# Patient Record
Sex: FEMALE | Race: WHITE | HISPANIC OR LATINO
[De-identification: names, ages, dates, MRNs, and addresses within clinical notes are randomized per-mention and may not be internally consistent; named-entity substitution may affect disease eponyms.]

---

## 2017-07-17 ENCOUNTER — APPOINTMENT (OUTPATIENT)
Dept: FAMILY MEDICINE | Facility: HOSPITAL | Age: 27
End: 2017-07-17

## 2017-07-17 ENCOUNTER — RESULT CHARGE (OUTPATIENT)
Age: 27
End: 2017-07-17

## 2017-07-17 ENCOUNTER — OUTPATIENT (OUTPATIENT)
Dept: OUTPATIENT SERVICES | Facility: HOSPITAL | Age: 27
LOS: 1 days | End: 2017-07-17
Payer: SELF-PAY

## 2017-07-17 VITALS
DIASTOLIC BLOOD PRESSURE: 75 MMHG | OXYGEN SATURATION: 98 % | TEMPERATURE: 98.1 F | HEIGHT: 59 IN | HEART RATE: 93 BPM | BODY MASS INDEX: 24.39 KG/M2 | RESPIRATION RATE: 16 BRPM | WEIGHT: 121 LBS | SYSTOLIC BLOOD PRESSURE: 112 MMHG

## 2017-07-18 ENCOUNTER — FORM ENCOUNTER (OUTPATIENT)
Age: 27
End: 2017-07-18

## 2017-07-18 LAB
BILIRUB UR QL STRIP: NEGATIVE
CLARITY UR: CLEAR
COLLECTION METHOD: NORMAL
GLUCOSE UR-MCNC: NEGATIVE
HCG UR QL: NEGATIVE
HGB UR QL STRIP.AUTO: NORMAL
KETONES UR-MCNC: NEGATIVE
LEUKOCYTE ESTERASE UR QL STRIP: NEGATIVE
NITRITE UR QL STRIP: NEGATIVE
QUALITY CONTROL: YES
SP GR UR STRIP: 1.02

## 2017-07-19 PROCEDURE — G0463: CPT

## 2017-07-19 PROCEDURE — 76700 US EXAM ABDOM COMPLETE: CPT

## 2017-07-19 PROCEDURE — 87086 URINE CULTURE/COLONY COUNT: CPT

## 2017-07-19 PROCEDURE — 81002 URINALYSIS NONAUTO W/O SCOPE: CPT

## 2017-07-27 ENCOUNTER — APPOINTMENT (OUTPATIENT)
Dept: FAMILY MEDICINE | Facility: HOSPITAL | Age: 27
End: 2017-07-27

## 2017-07-27 VITALS
DIASTOLIC BLOOD PRESSURE: 78 MMHG | BODY MASS INDEX: 24.6 KG/M2 | RESPIRATION RATE: 16 BRPM | OXYGEN SATURATION: 98 % | SYSTOLIC BLOOD PRESSURE: 115 MMHG | WEIGHT: 122 LBS | HEART RATE: 92 BPM | TEMPERATURE: 98 F | HEIGHT: 59 IN

## 2017-08-29 ENCOUNTER — EMERGENCY (EMERGENCY)
Facility: HOSPITAL | Age: 27
LOS: 1 days | Discharge: HOME | End: 2017-08-29
Attending: EMERGENCY MEDICINE | Admitting: EMERGENCY MEDICINE
Payer: MEDICAID

## 2017-08-29 VITALS
TEMPERATURE: 98 F | OXYGEN SATURATION: 100 % | HEART RATE: 59 BPM | RESPIRATION RATE: 16 BRPM | DIASTOLIC BLOOD PRESSURE: 75 MMHG | SYSTOLIC BLOOD PRESSURE: 115 MMHG

## 2017-08-29 VITALS
SYSTOLIC BLOOD PRESSURE: 125 MMHG | DIASTOLIC BLOOD PRESSURE: 84 MMHG | OXYGEN SATURATION: 100 % | TEMPERATURE: 98 F | WEIGHT: 119.93 LBS | HEART RATE: 78 BPM | RESPIRATION RATE: 18 BRPM

## 2017-08-29 LAB
ALBUMIN SERPL ELPH-MCNC: 4.4 G/DL — SIGNIFICANT CHANGE UP (ref 3.3–5)
ALP SERPL-CCNC: 83 U/L — SIGNIFICANT CHANGE UP (ref 40–120)
ALT FLD-CCNC: 17 U/L RC — SIGNIFICANT CHANGE UP (ref 10–45)
ANION GAP SERPL CALC-SCNC: 13 MMOL/L — SIGNIFICANT CHANGE UP (ref 5–17)
APPEARANCE UR: CLEAR — SIGNIFICANT CHANGE UP
AST SERPL-CCNC: 22 U/L — SIGNIFICANT CHANGE UP (ref 10–40)
BACTERIA # UR AUTO: ABNORMAL /HPF
BASOPHILS # BLD AUTO: 0.1 K/UL — SIGNIFICANT CHANGE UP (ref 0–0.2)
BASOPHILS NFR BLD AUTO: 1 % — SIGNIFICANT CHANGE UP (ref 0–2)
BILIRUB SERPL-MCNC: 0.3 MG/DL — SIGNIFICANT CHANGE UP (ref 0.2–1.2)
BILIRUB UR-MCNC: NEGATIVE — SIGNIFICANT CHANGE UP
BUN SERPL-MCNC: 10 MG/DL — SIGNIFICANT CHANGE UP (ref 7–23)
CALCIUM SERPL-MCNC: 9 MG/DL — SIGNIFICANT CHANGE UP (ref 8.4–10.5)
CHLORIDE SERPL-SCNC: 103 MMOL/L — SIGNIFICANT CHANGE UP (ref 96–108)
CO2 SERPL-SCNC: 24 MMOL/L — SIGNIFICANT CHANGE UP (ref 22–31)
COLOR SPEC: YELLOW — SIGNIFICANT CHANGE UP
CREAT SERPL-MCNC: 0.78 MG/DL — SIGNIFICANT CHANGE UP (ref 0.5–1.3)
DIFF PNL FLD: ABNORMAL
EOSINOPHIL # BLD AUTO: 0 K/UL — SIGNIFICANT CHANGE UP (ref 0–0.5)
EOSINOPHIL NFR BLD AUTO: 0.5 % — SIGNIFICANT CHANGE UP (ref 0–6)
EPI CELLS # UR: SIGNIFICANT CHANGE UP /HPF
GLUCOSE SERPL-MCNC: 87 MG/DL — SIGNIFICANT CHANGE UP (ref 70–99)
GLUCOSE UR QL: NEGATIVE — SIGNIFICANT CHANGE UP
HCG SERPL-ACNC: <2 MIU/ML — SIGNIFICANT CHANGE UP
HCT VFR BLD CALC: 39.6 % — SIGNIFICANT CHANGE UP (ref 34.5–45)
HGB BLD-MCNC: 13.5 G/DL — SIGNIFICANT CHANGE UP (ref 11.5–15.5)
KETONES UR-MCNC: NEGATIVE — SIGNIFICANT CHANGE UP
LEUKOCYTE ESTERASE UR-ACNC: NEGATIVE — SIGNIFICANT CHANGE UP
LIDOCAIN IGE QN: 43 U/L — SIGNIFICANT CHANGE UP (ref 7–60)
LYMPHOCYTES # BLD AUTO: 2.4 K/UL — SIGNIFICANT CHANGE UP (ref 1–3.3)
LYMPHOCYTES # BLD AUTO: 42.5 % — SIGNIFICANT CHANGE UP (ref 13–44)
MCHC RBC-ENTMCNC: 27.1 PG — SIGNIFICANT CHANGE UP (ref 27–34)
MCHC RBC-ENTMCNC: 34 GM/DL — SIGNIFICANT CHANGE UP (ref 32–36)
MCV RBC AUTO: 79.6 FL — LOW (ref 80–100)
MONOCYTES # BLD AUTO: 0.4 K/UL — SIGNIFICANT CHANGE UP (ref 0–0.9)
MONOCYTES NFR BLD AUTO: 7.5 % — SIGNIFICANT CHANGE UP (ref 2–14)
NEUTROPHILS # BLD AUTO: 2.8 K/UL — SIGNIFICANT CHANGE UP (ref 1.8–7.4)
NEUTROPHILS NFR BLD AUTO: 48.5 % — SIGNIFICANT CHANGE UP (ref 43–77)
NITRITE UR-MCNC: NEGATIVE — SIGNIFICANT CHANGE UP
PH UR: 6 — SIGNIFICANT CHANGE UP (ref 5–8)
PLATELET # BLD AUTO: 237 K/UL — SIGNIFICANT CHANGE UP (ref 150–400)
POTASSIUM SERPL-MCNC: 3.6 MMOL/L — SIGNIFICANT CHANGE UP (ref 3.5–5.3)
POTASSIUM SERPL-SCNC: 3.6 MMOL/L — SIGNIFICANT CHANGE UP (ref 3.5–5.3)
PROT SERPL-MCNC: 7.9 G/DL — SIGNIFICANT CHANGE UP (ref 6–8.3)
PROT UR-MCNC: SIGNIFICANT CHANGE UP
RBC # BLD: 4.97 M/UL — SIGNIFICANT CHANGE UP (ref 3.8–5.2)
RBC # FLD: 13.8 % — SIGNIFICANT CHANGE UP (ref 10.3–14.5)
RBC CASTS # UR COMP ASSIST: SIGNIFICANT CHANGE UP /HPF (ref 0–2)
SODIUM SERPL-SCNC: 140 MMOL/L — SIGNIFICANT CHANGE UP (ref 135–145)
SP GR SPEC: 1.02 — SIGNIFICANT CHANGE UP (ref 1.01–1.02)
UROBILINOGEN FLD QL: NEGATIVE — SIGNIFICANT CHANGE UP
WBC # BLD: 5.7 K/UL — SIGNIFICANT CHANGE UP (ref 3.8–10.5)
WBC # FLD AUTO: 5.7 K/UL — SIGNIFICANT CHANGE UP (ref 3.8–10.5)
WBC UR QL: SIGNIFICANT CHANGE UP /HPF (ref 0–5)

## 2017-08-29 PROCEDURE — 99284 EMERGENCY DEPT VISIT MOD MDM: CPT | Mod: 25

## 2017-08-29 PROCEDURE — 87086 URINE CULTURE/COLONY COUNT: CPT

## 2017-08-29 PROCEDURE — 80053 COMPREHEN METABOLIC PANEL: CPT

## 2017-08-29 PROCEDURE — 78226 HEPATOBILIARY SYSTEM IMAGING: CPT | Mod: 26

## 2017-08-29 PROCEDURE — 83690 ASSAY OF LIPASE: CPT

## 2017-08-29 PROCEDURE — 81001 URINALYSIS AUTO W/SCOPE: CPT

## 2017-08-29 PROCEDURE — 85027 COMPLETE CBC AUTOMATED: CPT

## 2017-08-29 PROCEDURE — 84702 CHORIONIC GONADOTROPIN TEST: CPT

## 2017-08-29 PROCEDURE — 76705 ECHO EXAM OF ABDOMEN: CPT | Mod: 26

## 2017-08-29 PROCEDURE — 82550 ASSAY OF CK (CPK): CPT

## 2017-08-29 PROCEDURE — 99285 EMERGENCY DEPT VISIT HI MDM: CPT

## 2017-08-29 PROCEDURE — A9537: CPT

## 2017-08-29 PROCEDURE — 78226 HEPATOBILIARY SYSTEM IMAGING: CPT

## 2017-08-29 PROCEDURE — 96374 THER/PROPH/DIAG INJ IV PUSH: CPT

## 2017-08-29 PROCEDURE — 76705 ECHO EXAM OF ABDOMEN: CPT

## 2017-08-29 RX ORDER — FAMOTIDINE 10 MG/ML
20 INJECTION INTRAVENOUS ONCE
Qty: 0 | Refills: 0 | Status: COMPLETED | OUTPATIENT
Start: 2017-08-29 | End: 2017-08-29

## 2017-08-29 RX ADMIN — Medication 30 MILLILITER(S): at 12:33

## 2017-08-29 RX ADMIN — FAMOTIDINE 20 MILLIGRAM(S): 10 INJECTION INTRAVENOUS at 12:33

## 2017-08-29 NOTE — ED ADULT NURSE REASSESSMENT NOTE - NS ED NURSE REASSESS COMMENT FT1
Patient laying in bed with family at bedside. States she feels better and only has a little bit of pain. MD Mcgowan made aware. Patient to be discharged.

## 2017-08-29 NOTE — ED ADULT NURSE NOTE - OBJECTIVE STATEMENT
26y.o female arrived to ED c.o epigastric pain x3 months. Pt was seen by PMD treated for UTI 1 month ago. Pt arrived a&ox4, neg sob, neg chest pain, + epigastric pain, + nausea, + diarrhea, + urinary frequency, + diarrhea, abd soft, pulse motor sensoryx4, skin warm dry intact. 20g IV LAC. Pt resting comfortably, family at bedside. will continue to monitor. 26y.o female arrived to ED c.o epigastric pain x3 months. Pt was seen by PMD treated for UTI 1 month ago. Pt arrived a&ox4, neg sob, neg chest pain, + epigastric pain, + nausea, + diarrhea, + urinary frequency, pt currently menstruating,  + diarrhea, abd soft, pulse motor sensoryx4, skin warm dry intact. 20g IV LAC. Pt resting comfortably, family at bedside. will continue to monitor.

## 2017-08-29 NOTE — CONSULT NOTE ADULT - ASSESSMENT
26 year old woman with abdominal pain consistent with biliary colic vs. gastritis/gastric ulcer.  - Recommend GI evaluation as outpatient  - Recommend follow up as outpatient with Dr. Torrey Barrios regarding possible elective laparoscopic cholecystectomy  - Discussed with Dr. Zack Farris PGY3 [id number]

## 2017-08-29 NOTE — ED PROVIDER NOTE - PROGRESS NOTE DETAILS
indeterminate us, d/w surg will see pt -Josafat Mcgowan MD- pt is feeling better. told to outpt with sx. HIDA normal

## 2017-08-29 NOTE — ED PROVIDER NOTE - OBJECTIVE STATEMENT
26 female presents with c/o epigastric pain x 3 months. pt states pain is worse after eating, also associated with diarrhea. pt states also with pain when urinating intermittently for few months. pt states was given medication by her doctor for UTI 1 month ago, but symptoms have returned.

## 2017-08-29 NOTE — ED PROVIDER NOTE - ATTENDING CONTRIBUTION TO CARE
26 year old female epigastric abdominal pain today, worse w food, similar pain for 3 months. ttp ruq. labs, us gb, ua, preg, ekg.

## 2017-08-29 NOTE — CONSULT NOTE ADULT - SUBJECTIVE AND OBJECTIVE BOX
General Surgery Consultation Note  Pager#[phone]    HPI: 26 year old woman presents with epigastric and right upper quadrant pain since 8 am this morning.  She has had similar intermittent pain for the past 3 months but the pain usually does not last this long.  Her usual pain is not associated with food or any particular time of day.  Her pain has improved since she has been in the ED.  RUQ ultrasound shows cholelithiasis, not consistent with cholecystitis.  HIDA scan is not concerning for cholecystitis.        Medical History: none  Surgical History: none  Allergies: No known drug allergies  Social History: nonsmoker, no etoh or drug abuse; lives with  and son  Family History: noncontributory    Review of Systems:  General: No weight changes, no fevers or chills, no weakness  Neurologic: No numbness or weakness, no facial drooping  Cardiovascular: No chest pain or shortness of breath, no extremity edema  Pulmonary: No cough or hemoptysis, no wheezing  Abdominal: + epigastric/RUQ abdominal pain, no hematochezia, no changes in bowel habits  Genitourinary: No hematuria, no dysuria  Skin: no rashes or bruising        Vital Signs Last 24 Hrs  T(C): 36.9 (29 Aug 2017 15:33), Max: 36.9 (29 Aug 2017 15:33)  T(F): 98.5 (29 Aug 2017 15:33), Max: 98.5 (29 Aug 2017 15:33)  HR: 59 (29 Aug 2017 15:33) (59 - 78)  BP: 115/75 (29 Aug 2017 15:33) (115/75 - 134/85)  RR: 16 (29 Aug 2017 15:33) (16 - 18)  SpO2: 100% (29 Aug 2017 15:33) (99% - 100%)    Physical Exam  General: alert, no distress  Psychiatric: affect appropriate  Neurologic: No focal neurologic deficits  Cardiovascular: Regular rate and rhythm  Pulmonary: Breathing unlabored  Abdominal: Soft, nondistended, nontender  Extremities: Moves all extremities, warm  Skin: Warm and dry, no wounds    CBC ( @ 12:07)                          13.5                     5.7     )--------------(  237        48.5  % Neuts, 42.5  % Lymphs, ANC: 2.8                             39.6      BMP ( @ 12:07)       140     |  103     |  10    			Ca++ --      Ca 9.0          ---------------------------------( 87    		Mg --           3.6     |  24      |  0.78  			Ph --        LFTs ( @ 12:07)      TPro 7.9 / Alb 4.4 / TBili 0.3 / DBili -- / AST 22 / ALT 17 / AlkPhos 83      Cardiac Markers ( @ 12:07)     Trop: -- -- / CKMB: -- / CK: 84    Urinalysis ( @ 12:07):     Color: Yellow / Appearance: Clear / S.017 / pH: 6.0 / Gluc: Negative / Ketones: Negative / Bili: Negative / Urobili: Negative / Protein :Trace / Nitrites: Negative / Leuk.Est: Negative / RBC: 3-5 / WBC: 0-2 / Sq Epi:  / Non Sq Epi: Few / Bacteria Few<!>       Imaging: see HPI

## 2017-08-29 NOTE — CONSULT NOTE ADULT - ATTENDING COMMENTS
Patient discussed w/ resident. Discharged from ED prior to my evaluation.  Abd pain, possible biliary colic. No cholecystitis on imaging. Patient may f/u with surgery if desired as outpt.

## 2017-08-30 LAB
CULTURE RESULTS: SIGNIFICANT CHANGE UP
SPECIMEN SOURCE: SIGNIFICANT CHANGE UP

## 2018-04-03 ENCOUNTER — EMERGENCY (EMERGENCY)
Facility: HOSPITAL | Age: 28
LOS: 1 days | Discharge: HOME | End: 2018-04-03
Attending: EMERGENCY MEDICINE
Payer: MEDICAID

## 2018-04-03 VITALS
OXYGEN SATURATION: 99 % | SYSTOLIC BLOOD PRESSURE: 113 MMHG | HEART RATE: 119 BPM | TEMPERATURE: 101 F | RESPIRATION RATE: 20 BRPM | DIASTOLIC BLOOD PRESSURE: 80 MMHG

## 2018-04-03 PROCEDURE — 93010 ELECTROCARDIOGRAM REPORT: CPT | Mod: 59

## 2018-04-03 PROCEDURE — 62270 DX LMBR SPI PNXR: CPT

## 2018-04-03 PROCEDURE — 99284 EMERGENCY DEPT VISIT MOD MDM: CPT | Mod: 25

## 2018-04-03 RX ORDER — VANCOMYCIN HCL 1 G
1000 VIAL (EA) INTRAVENOUS ONCE
Qty: 0 | Refills: 0 | Status: COMPLETED | OUTPATIENT
Start: 2018-04-03 | End: 2018-04-04

## 2018-04-03 RX ORDER — DEXAMETHASONE 0.5 MG/5ML
10 ELIXIR ORAL ONCE
Qty: 0 | Refills: 0 | Status: COMPLETED | OUTPATIENT
Start: 2018-04-03 | End: 2018-04-03

## 2018-04-03 RX ORDER — IPRATROPIUM/ALBUTEROL SULFATE 18-103MCG
3 AEROSOL WITH ADAPTER (GRAM) INHALATION ONCE
Qty: 0 | Refills: 0 | Status: COMPLETED | OUTPATIENT
Start: 2018-04-03 | End: 2018-04-03

## 2018-04-03 RX ORDER — SODIUM CHLORIDE 9 MG/ML
2000 INJECTION INTRAMUSCULAR; INTRAVENOUS; SUBCUTANEOUS ONCE
Qty: 0 | Refills: 0 | Status: COMPLETED | OUTPATIENT
Start: 2018-04-03 | End: 2018-04-03

## 2018-04-03 RX ORDER — ACETAMINOPHEN 500 MG
1000 TABLET ORAL ONCE
Qty: 0 | Refills: 0 | Status: COMPLETED | OUTPATIENT
Start: 2018-04-03 | End: 2018-04-04

## 2018-04-03 RX ORDER — CEFTRIAXONE 500 MG/1
2 INJECTION, POWDER, FOR SOLUTION INTRAMUSCULAR; INTRAVENOUS ONCE
Qty: 0 | Refills: 0 | Status: COMPLETED | OUTPATIENT
Start: 2018-04-03 | End: 2018-04-03

## 2018-04-03 NOTE — ED PROVIDER NOTE - PROGRESS NOTE DETAILS
patient feeling much better. lp without any signs of meningitis. vss. walking around ED without issues. Pt does have LLL pneumonia - treated with abx and abx sent to pharmacy. Pt will follow up at medicine clinic. pt given strict return precautions. Chintan Hernandez M.D., Attending Physician

## 2018-04-03 NOTE — ED PROVIDER NOTE - ATTENDING CONTRIBUTION TO CARE
27F Surinamese speaking  #[id number] presents to the ED with fever cough and headache. Symptoms started 4 days ago. Cough non-productive. Today pt started having headache 10/10 associated with photophobia and neck stiffiness. Pt states neck discomfort worse when moving side head from side to side. No skin rash. No one at home sick. no recent travel. Tried tylenol early in the day without help. On Exam pt uncomfortable appearing tachycardic clear lungs abd soft non-tender erythema to b/l eyes pharynx normal no pta uvual midline neck without signs of ludwigs. no skin rash. Given hx and fever likely virus however headache photophobia and neck pain concern for meningitis - likely viral. Will obtain labs xray empiric coverage abx steroids and likely admit.     Constitutional: + fever and chills  Eyes: eye redness + photophobia  HEENT: No throat pain  CV: No chest pain  Resp: + SOB and cough  GI: No abd pain, nausea or vomiting  : No dysuria  MSK: + body aches and neck pain  Skin: No rash  Neuro: No headache     Constitutional: mild distress AAOx3  Eyes: PERRLA EOMI  Head: Normocephalic atraumatic  Mouth: MMM  Cardiac: tachycardic  Resp: Lungs CTAB  GI: Abd s/nt/nd  Neuro: CN2-12 intact  Skin: No rashes   Chintan Hernandez M.D., Attending Physician

## 2018-04-03 NOTE — ED PROVIDER NOTE - CARE PLAN
Principal Discharge DX:	Pneumonia  Assessment and plan of treatment:	1. return for worsening symptoms or anything concerning to you  2. take all home meds as prescribed  3. follow up with your pmd call to make an appointment  4. take levaquin as directed  5. drink plenty of fluids  6. follow up with medicine clinic see attached

## 2018-04-03 NOTE — ED PROVIDER NOTE - PLAN OF CARE
1. return for worsening symptoms or anything concerning to you  2. take all home meds as prescribed  3. follow up with your pmd call to make an appointment  4. take levaquin as directed  5. drink plenty of fluids  6. follow up with medicine clinic see attached

## 2018-04-03 NOTE — ED PROVIDER NOTE - MEDICAL DECISION MAKING DETAILS
27F Polish speaking  #[id number] presents to the ED with fever cough and headache. Symptoms started 4 days ago. Cough non-productive. Today pt started having headache 10/10 associated with photophobia and neck stiffiness. Pt states neck discomfort worse when moving side head from side to side. No skin rash. No one at home sick. no recent travel. Tried tylenol early in the day without help. On Exam pt uncomfortable appearing tachycardic clear lungs abd soft non-tender erythema to b/l eyes pharynx normal no pta uvual midline neck without signs of ludwigs. no skin rash. Given hx and fever likely virus however headache photophobia and neck pain concern for meningitis - likely viral. Will obtain labs xray empiric coverage abx steroids and likely admit. Chintan Hernandez M.D., Attending Physician

## 2018-04-03 NOTE — ED ADULT NURSE NOTE - OBJECTIVE STATEMENT
28 yo presents to the ED from home. A&Ox4, primarily [place] Islander speaking, family at bedside for translation, denies translation services. pt reports body aches, nonproductive cough, runny nose, HA, nausea x 4 days. pt denies abd pain, diarrhea, vomiting. pt reports taking Tylenol for pain and fever, last dose taken at 1600 today. pt is febrile upon assessment. pt denies CP, SOB. pt denies medical history. pt denies sick contact. pt denies international travel. pt denies rash. family at bedside.

## 2018-04-04 VITALS
HEART RATE: 94 BPM | RESPIRATION RATE: 17 BRPM | TEMPERATURE: 98 F | DIASTOLIC BLOOD PRESSURE: 65 MMHG | OXYGEN SATURATION: 98 % | SYSTOLIC BLOOD PRESSURE: 97 MMHG

## 2018-04-04 LAB
ALBUMIN SERPL ELPH-MCNC: 4 G/DL — SIGNIFICANT CHANGE UP (ref 3.3–5)
ALP SERPL-CCNC: 101 U/L — SIGNIFICANT CHANGE UP (ref 40–120)
ALT FLD-CCNC: 19 U/L RC — SIGNIFICANT CHANGE UP (ref 10–45)
ANION GAP SERPL CALC-SCNC: 16 MMOL/L — SIGNIFICANT CHANGE UP (ref 5–17)
APPEARANCE CSF: CLEAR — SIGNIFICANT CHANGE UP
APPEARANCE UR: CLEAR — SIGNIFICANT CHANGE UP
AST SERPL-CCNC: 23 U/L — SIGNIFICANT CHANGE UP (ref 10–40)
B PERT DNA SPEC QL NAA+PROBE: DETECTED
BASOPHILS # BLD AUTO: 0 K/UL — SIGNIFICANT CHANGE UP (ref 0–0.2)
BASOPHILS NFR BLD AUTO: 0.3 % — SIGNIFICANT CHANGE UP (ref 0–2)
BILIRUB SERPL-MCNC: 0.2 MG/DL — SIGNIFICANT CHANGE UP (ref 0.2–1.2)
BILIRUB UR-MCNC: NEGATIVE — SIGNIFICANT CHANGE UP
BUN SERPL-MCNC: 8 MG/DL — SIGNIFICANT CHANGE UP (ref 7–23)
CALCIUM SERPL-MCNC: 8.8 MG/DL — SIGNIFICANT CHANGE UP (ref 8.4–10.5)
CHLORIDE SERPL-SCNC: 100 MMOL/L — SIGNIFICANT CHANGE UP (ref 96–108)
CO2 SERPL-SCNC: 21 MMOL/L — LOW (ref 22–31)
COLOR CSF: SIGNIFICANT CHANGE UP
COLOR SPEC: SIGNIFICANT CHANGE UP
CREAT SERPL-MCNC: 0.78 MG/DL — SIGNIFICANT CHANGE UP (ref 0.5–1.3)
DIFF PNL FLD: NEGATIVE — SIGNIFICANT CHANGE UP
EOSINOPHIL # BLD AUTO: 0 K/UL — SIGNIFICANT CHANGE UP (ref 0–0.5)
EOSINOPHIL NFR BLD AUTO: 0.2 % — SIGNIFICANT CHANGE UP (ref 0–6)
EPI CELLS # UR: SIGNIFICANT CHANGE UP /HPF
GAS PNL BLDV: SIGNIFICANT CHANGE UP
GLUCOSE CSF-MCNC: 61 MG/DL — SIGNIFICANT CHANGE UP (ref 40–70)
GLUCOSE SERPL-MCNC: 97 MG/DL — SIGNIFICANT CHANGE UP (ref 70–99)
GLUCOSE UR QL: NEGATIVE — SIGNIFICANT CHANGE UP
GRAM STN FLD: SIGNIFICANT CHANGE UP
HCG SERPL-ACNC: <2 MIU/ML — LOW (ref 5–24)
HCT VFR BLD CALC: 38.1 % — SIGNIFICANT CHANGE UP (ref 34.5–45)
HGB BLD-MCNC: 13 G/DL — SIGNIFICANT CHANGE UP (ref 11.5–15.5)
KETONES UR-MCNC: ABNORMAL
LEUKOCYTE ESTERASE UR-ACNC: NEGATIVE — SIGNIFICANT CHANGE UP
LYMPHOCYTES # BLD AUTO: 1.7 K/UL — SIGNIFICANT CHANGE UP (ref 1–3.3)
LYMPHOCYTES # BLD AUTO: 25.7 % — SIGNIFICANT CHANGE UP (ref 13–44)
LYMPHOCYTES # CSF: 67 % — SIGNIFICANT CHANGE UP (ref 40–80)
MCHC RBC-ENTMCNC: 27.1 PG — SIGNIFICANT CHANGE UP (ref 27–34)
MCHC RBC-ENTMCNC: 34.2 GM/DL — SIGNIFICANT CHANGE UP (ref 32–36)
MCV RBC AUTO: 79.4 FL — LOW (ref 80–100)
MONOCYTES # BLD AUTO: 0.5 K/UL — SIGNIFICANT CHANGE UP (ref 0–0.9)
MONOCYTES NFR BLD AUTO: 7.4 % — SIGNIFICANT CHANGE UP (ref 2–14)
MONOS+MACROS NFR CSF: 28 % — SIGNIFICANT CHANGE UP (ref 15–45)
NEUTROPHILS # BLD AUTO: 4.4 K/UL — SIGNIFICANT CHANGE UP (ref 1.8–7.4)
NEUTROPHILS # CSF: 5 % — SIGNIFICANT CHANGE UP (ref 0–6)
NEUTROPHILS NFR BLD AUTO: 66.4 % — SIGNIFICANT CHANGE UP (ref 43–77)
NITRITE UR-MCNC: NEGATIVE — SIGNIFICANT CHANGE UP
NRBC NFR CSF: 7 /UL — HIGH (ref 0–5)
PH UR: 6 — SIGNIFICANT CHANGE UP (ref 5–8)
PLATELET # BLD AUTO: 170 K/UL — SIGNIFICANT CHANGE UP (ref 150–400)
POTASSIUM SERPL-MCNC: 3.4 MMOL/L — LOW (ref 3.5–5.3)
POTASSIUM SERPL-SCNC: 3.4 MMOL/L — LOW (ref 3.5–5.3)
PROT CSF-MCNC: 18 MG/DL — SIGNIFICANT CHANGE UP (ref 15–45)
PROT SERPL-MCNC: 7.8 G/DL — SIGNIFICANT CHANGE UP (ref 6–8.3)
PROT UR-MCNC: NEGATIVE — SIGNIFICANT CHANGE UP
RAPID RVP RESULT: DETECTED
RBC # BLD: 4.8 M/UL — SIGNIFICANT CHANGE UP (ref 3.8–5.2)
RBC # CSF: 217 /UL — HIGH (ref 0–0)
RBC # FLD: 14 % — SIGNIFICANT CHANGE UP (ref 10.3–14.5)
RBC CASTS # UR COMP ASSIST: SIGNIFICANT CHANGE UP /HPF (ref 0–2)
SODIUM SERPL-SCNC: 137 MMOL/L — SIGNIFICANT CHANGE UP (ref 135–145)
SP GR SPEC: 1.01 — SIGNIFICANT CHANGE UP (ref 1.01–1.02)
SPECIMEN SOURCE: SIGNIFICANT CHANGE UP
TUBE TYPE: SIGNIFICANT CHANGE UP
UROBILINOGEN FLD QL: NEGATIVE — SIGNIFICANT CHANGE UP
WBC # BLD: 6.7 K/UL — SIGNIFICANT CHANGE UP (ref 3.8–10.5)
WBC # FLD AUTO: 6.7 K/UL — SIGNIFICANT CHANGE UP (ref 3.8–10.5)
WBC UR QL: SIGNIFICANT CHANGE UP /HPF (ref 0–5)

## 2018-04-04 PROCEDURE — 84295 ASSAY OF SERUM SODIUM: CPT

## 2018-04-04 PROCEDURE — 71045 X-RAY EXAM CHEST 1 VIEW: CPT

## 2018-04-04 PROCEDURE — 82945 GLUCOSE OTHER FLUID: CPT

## 2018-04-04 PROCEDURE — 82330 ASSAY OF CALCIUM: CPT

## 2018-04-04 PROCEDURE — 85014 HEMATOCRIT: CPT

## 2018-04-04 PROCEDURE — 82435 ASSAY OF BLOOD CHLORIDE: CPT

## 2018-04-04 PROCEDURE — 87581 M.PNEUMON DNA AMP PROBE: CPT

## 2018-04-04 PROCEDURE — 87070 CULTURE OTHR SPECIMN AEROBIC: CPT

## 2018-04-04 PROCEDURE — 89051 BODY FLUID CELL COUNT: CPT

## 2018-04-04 PROCEDURE — 80053 COMPREHEN METABOLIC PANEL: CPT

## 2018-04-04 PROCEDURE — 96375 TX/PRO/DX INJ NEW DRUG ADDON: CPT | Mod: XU

## 2018-04-04 PROCEDURE — 84157 ASSAY OF PROTEIN OTHER: CPT

## 2018-04-04 PROCEDURE — 71045 X-RAY EXAM CHEST 1 VIEW: CPT | Mod: 26

## 2018-04-04 PROCEDURE — 82947 ASSAY GLUCOSE BLOOD QUANT: CPT

## 2018-04-04 PROCEDURE — 83605 ASSAY OF LACTIC ACID: CPT

## 2018-04-04 PROCEDURE — 99285 EMERGENCY DEPT VISIT HI MDM: CPT | Mod: 25

## 2018-04-04 PROCEDURE — 84132 ASSAY OF SERUM POTASSIUM: CPT

## 2018-04-04 PROCEDURE — 87486 CHLMYD PNEUM DNA AMP PROBE: CPT

## 2018-04-04 PROCEDURE — 85027 COMPLETE CBC AUTOMATED: CPT

## 2018-04-04 PROCEDURE — 87205 SMEAR GRAM STAIN: CPT

## 2018-04-04 PROCEDURE — 87086 URINE CULTURE/COLONY COUNT: CPT

## 2018-04-04 PROCEDURE — 87798 DETECT AGENT NOS DNA AMP: CPT

## 2018-04-04 PROCEDURE — 93005 ELECTROCARDIOGRAM TRACING: CPT | Mod: XU

## 2018-04-04 PROCEDURE — 87040 BLOOD CULTURE FOR BACTERIA: CPT

## 2018-04-04 PROCEDURE — 81001 URINALYSIS AUTO W/SCOPE: CPT

## 2018-04-04 PROCEDURE — 82803 BLOOD GASES ANY COMBINATION: CPT

## 2018-04-04 PROCEDURE — 84702 CHORIONIC GONADOTROPIN TEST: CPT

## 2018-04-04 PROCEDURE — 87633 RESP VIRUS 12-25 TARGETS: CPT

## 2018-04-04 PROCEDURE — 62270 DX LMBR SPI PNXR: CPT

## 2018-04-04 PROCEDURE — 83615 LACTATE (LD) (LDH) ENZYME: CPT

## 2018-04-04 PROCEDURE — 96374 THER/PROPH/DIAG INJ IV PUSH: CPT | Mod: XU

## 2018-04-04 PROCEDURE — 94640 AIRWAY INHALATION TREATMENT: CPT

## 2018-04-04 RX ORDER — AZITHROMYCIN 500 MG/1
500 TABLET, FILM COATED ORAL ONCE
Qty: 0 | Refills: 0 | Status: DISCONTINUED | OUTPATIENT
Start: 2018-04-04 | End: 2018-04-04

## 2018-04-04 RX ORDER — SODIUM CHLORIDE 9 MG/ML
1000 INJECTION INTRAMUSCULAR; INTRAVENOUS; SUBCUTANEOUS ONCE
Qty: 0 | Refills: 0 | Status: COMPLETED | OUTPATIENT
Start: 2018-04-04 | End: 2018-04-04

## 2018-04-04 RX ADMIN — Medication 3 MILLILITER(S): at 00:00

## 2018-04-04 RX ADMIN — SODIUM CHLORIDE 2000 MILLILITER(S): 9 INJECTION INTRAMUSCULAR; INTRAVENOUS; SUBCUTANEOUS at 00:00

## 2018-04-04 RX ADMIN — Medication 102 MILLIGRAM(S): at 00:57

## 2018-04-04 RX ADMIN — SODIUM CHLORIDE 1000 MILLILITER(S): 9 INJECTION INTRAMUSCULAR; INTRAVENOUS; SUBCUTANEOUS at 02:40

## 2018-04-04 RX ADMIN — Medication 400 MILLIGRAM(S): at 00:00

## 2018-04-04 RX ADMIN — Medication 250 MILLIGRAM(S): at 01:26

## 2018-04-04 RX ADMIN — CEFTRIAXONE 100 GRAM(S): 500 INJECTION, POWDER, FOR SOLUTION INTRAMUSCULAR; INTRAVENOUS at 00:26

## 2018-04-04 NOTE — ED PROCEDURE NOTE - ATTENDING CONTRIBUTION TO CARE
agree with above - verbal consent obtained with  phone. procedure done under sterile conditions. no complications.

## 2018-04-05 LAB
CULTURE RESULTS: NO GROWTH — SIGNIFICANT CHANGE UP
SPECIMEN SOURCE: SIGNIFICANT CHANGE UP

## 2018-04-06 LAB
CULTURE RESULTS: NO GROWTH — SIGNIFICANT CHANGE UP
SPECIMEN SOURCE: SIGNIFICANT CHANGE UP

## 2018-04-09 LAB
CULTURE RESULTS: SIGNIFICANT CHANGE UP
CULTURE RESULTS: SIGNIFICANT CHANGE UP
SPECIMEN SOURCE: SIGNIFICANT CHANGE UP
SPECIMEN SOURCE: SIGNIFICANT CHANGE UP

## 2018-09-20 ENCOUNTER — APPOINTMENT (OUTPATIENT)
Dept: OBGYN | Facility: CLINIC | Age: 28
End: 2018-09-20
Payer: SELF-PAY

## 2018-09-20 ENCOUNTER — RESULT REVIEW (OUTPATIENT)
Age: 28
End: 2018-09-20

## 2018-09-20 ENCOUNTER — OUTPATIENT (OUTPATIENT)
Dept: OUTPATIENT SERVICES | Facility: HOSPITAL | Age: 28
LOS: 1 days | End: 2018-09-20
Payer: SELF-PAY

## 2018-09-20 VITALS
WEIGHT: 116.13 LBS | HEIGHT: 59 IN | SYSTOLIC BLOOD PRESSURE: 100 MMHG | BODY MASS INDEX: 23.41 KG/M2 | DIASTOLIC BLOOD PRESSURE: 60 MMHG

## 2018-09-20 DIAGNOSIS — Z00.00 ENCOUNTER FOR GENERAL ADULT MEDICAL EXAMINATION W/OUT ABNORMAL FINDINGS: ICD-10-CM

## 2018-09-20 DIAGNOSIS — N76.0 ACUTE VAGINITIS: ICD-10-CM

## 2018-09-20 PROCEDURE — 81025 URINE PREGNANCY TEST: CPT | Mod: NC

## 2018-09-20 PROCEDURE — G0463: CPT | Mod: 25

## 2018-09-20 PROCEDURE — 76857 US EXAM PELVIC LIMITED: CPT

## 2018-09-20 PROCEDURE — 99203 OFFICE O/P NEW LOW 30 MIN: CPT | Mod: NC

## 2018-09-20 PROCEDURE — 76857 US EXAM PELVIC LIMITED: CPT | Mod: 26,NC

## 2018-09-20 PROCEDURE — 81025 URINE PREGNANCY TEST: CPT

## 2018-09-20 PROCEDURE — 81003 URINALYSIS AUTO W/O SCOPE: CPT | Mod: NC,QW

## 2018-09-20 PROCEDURE — 81003 URINALYSIS AUTO W/O SCOPE: CPT

## 2018-09-20 RX ORDER — NITROFURANTOIN (MONOHYDRATE/MACROCRYSTALS) 25; 75 MG/1; MG/1
100 CAPSULE ORAL
Qty: 10 | Refills: 0 | Status: DISCONTINUED | COMMUNITY
Start: 2017-07-17 | End: 2018-09-20

## 2018-10-03 DIAGNOSIS — Z34.90 ENCOUNTER FOR SUPERVISION OF NORMAL PREGNANCY, UNSPECIFIED, UNSPECIFIED TRIMESTER: ICD-10-CM

## 2018-10-05 ENCOUNTER — APPOINTMENT (OUTPATIENT)
Dept: ANTEPARTUM | Facility: CLINIC | Age: 28
End: 2018-10-05
Payer: MEDICAID

## 2018-10-05 ENCOUNTER — OUTPATIENT (OUTPATIENT)
Dept: OUTPATIENT SERVICES | Facility: HOSPITAL | Age: 28
LOS: 1 days | End: 2018-10-05
Payer: SELF-PAY

## 2018-10-05 ENCOUNTER — ASOB RESULT (OUTPATIENT)
Age: 28
End: 2018-10-05

## 2018-10-05 DIAGNOSIS — Z34.90 ENCOUNTER FOR SUPERVISION OF NORMAL PREGNANCY, UNSPECIFIED, UNSPECIFIED TRIMESTER: ICD-10-CM

## 2018-10-05 PROCEDURE — 36416 COLLJ CAPILLARY BLOOD SPEC: CPT

## 2018-10-05 PROCEDURE — 84704 HCG FREE BETACHAIN TEST: CPT

## 2018-10-05 PROCEDURE — 76813 OB US NUCHAL MEAS 1 GEST: CPT

## 2018-10-05 PROCEDURE — 76801 OB US < 14 WKS SINGLE FETUS: CPT

## 2018-10-08 LAB
1ST TRIMESTER DATA: SIGNIFICANT CHANGE UP
ADDENDUM DOC: SIGNIFICANT CHANGE UP
AFP AMN-MCNC: SIGNIFICANT CHANGE UP
B-HCG FREE SERPL-MCNC: SIGNIFICANT CHANGE UP
CLINICAL BIOCHEMIST REVIEW: SIGNIFICANT CHANGE UP
CLINICAL BIOCHEMIST REVIEW: SIGNIFICANT CHANGE UP
DEMOGRAPHIC DATA: SIGNIFICANT CHANGE UP
NT: SIGNIFICANT CHANGE UP
PAPP-A SERPL-ACNC: SIGNIFICANT CHANGE UP
SCREEN-FOOTER: SIGNIFICANT CHANGE UP
SCREEN-RECOMMENDATIONS: SIGNIFICANT CHANGE UP

## 2018-10-24 ENCOUNTER — APPOINTMENT (OUTPATIENT)
Dept: OBGYN | Facility: CLINIC | Age: 28
End: 2018-10-24
Payer: SELF-PAY

## 2018-10-24 ENCOUNTER — APPOINTMENT (OUTPATIENT)
Dept: MATERNAL FETAL MEDICINE | Facility: CLINIC | Age: 28
End: 2018-10-24

## 2018-10-24 ENCOUNTER — OUTPATIENT (OUTPATIENT)
Dept: OUTPATIENT SERVICES | Facility: HOSPITAL | Age: 28
LOS: 1 days | End: 2018-10-24
Payer: MEDICAID

## 2018-10-24 ENCOUNTER — MED ADMIN CHARGE (OUTPATIENT)
Age: 28
End: 2018-10-24

## 2018-10-24 VITALS — DIASTOLIC BLOOD PRESSURE: 70 MMHG | WEIGHT: 119 LBS | BODY MASS INDEX: 24.04 KG/M2 | SYSTOLIC BLOOD PRESSURE: 110 MMHG

## 2018-10-24 DIAGNOSIS — Z34.80 ENCOUNTER FOR SUPERVISION OF OTHER NORMAL PREGNANCY, UNSPECIFIED TRIMESTER: ICD-10-CM

## 2018-10-24 DIAGNOSIS — Z34.90 ENCOUNTER FOR SUPERVISION OF NORMAL PREGNANCY, UNSPECIFIED, UNSPECIFIED TRIMESTER: ICD-10-CM

## 2018-10-24 LAB
HBV SURFACE AG SER-ACNC: SIGNIFICANT CHANGE UP
HCT VFR BLD CALC: 33.1 % — LOW (ref 34.5–45)
HGB BLD-MCNC: 11.3 G/DL — LOW (ref 11.5–15.5)
HIV 1+2 AB+HIV1 P24 AG SERPL QL IA: SIGNIFICANT CHANGE UP
MCHC RBC-ENTMCNC: 26.7 PG — LOW (ref 27–34)
MCHC RBC-ENTMCNC: 34.1 GM/DL — SIGNIFICANT CHANGE UP (ref 32–36)
MCV RBC AUTO: 78.1 FL — LOW (ref 80–100)
PLATELET # BLD AUTO: 200 K/UL — SIGNIFICANT CHANGE UP (ref 150–400)
RBC # BLD: 4.24 M/UL — SIGNIFICANT CHANGE UP (ref 3.8–5.2)
RBC # FLD: 15.7 % — HIGH (ref 10.3–14.5)
RUBV IGG SER-ACNC: 4.3 INDEX — SIGNIFICANT CHANGE UP
RUBV IGG SER-IMP: POSITIVE — SIGNIFICANT CHANGE UP
T PALLIDUM AB TITR SER: NEGATIVE — SIGNIFICANT CHANGE UP
TSH SERPL-MCNC: 1.76 UIU/ML — SIGNIFICANT CHANGE UP (ref 0.27–4.2)
WBC # BLD: 8.09 K/UL — SIGNIFICANT CHANGE UP (ref 3.8–10.5)
WBC # FLD AUTO: 8.09 K/UL — SIGNIFICANT CHANGE UP (ref 3.8–10.5)

## 2018-10-24 PROCEDURE — 99213 OFFICE O/P EST LOW 20 MIN: CPT | Mod: NC

## 2018-10-24 PROCEDURE — 90471 IMMUNIZATION ADMIN: CPT | Mod: NC

## 2018-10-24 PROCEDURE — 81243 FMR1 GEN ALY DETC ABNL ALLEL: CPT

## 2018-10-24 PROCEDURE — 97802 MEDICAL NUTRITION INDIV IN: CPT

## 2018-10-24 PROCEDURE — 86900 BLOOD TYPING SEROLOGIC ABO: CPT

## 2018-10-24 PROCEDURE — 83655 ASSAY OF LEAD: CPT

## 2018-10-24 PROCEDURE — 90656 IIV3 VACC NO PRSV 0.5 ML IM: CPT | Mod: NC

## 2018-10-24 PROCEDURE — 87086 URINE CULTURE/COLONY COUNT: CPT

## 2018-10-24 PROCEDURE — 87591 N.GONORRHOEAE DNA AMP PROB: CPT

## 2018-10-24 PROCEDURE — 87340 HEPATITIS B SURFACE AG IA: CPT

## 2018-10-24 PROCEDURE — 84443 ASSAY THYROID STIM HORMONE: CPT

## 2018-10-24 PROCEDURE — 86336 INHIBIN A: CPT

## 2018-10-24 PROCEDURE — 76815 OB US LIMITED FETUS(S): CPT

## 2018-10-24 PROCEDURE — 83020 HEMOGLOBIN ELECTROPHORESIS: CPT | Mod: 26

## 2018-10-24 PROCEDURE — 81003 URINALYSIS AUTO W/O SCOPE: CPT

## 2018-10-24 PROCEDURE — 86762 RUBELLA ANTIBODY: CPT

## 2018-10-24 PROCEDURE — 81220 CFTR GENE COM VARIANTS: CPT

## 2018-10-24 PROCEDURE — 83020 HEMOGLOBIN ELECTROPHORESIS: CPT

## 2018-10-24 PROCEDURE — 87491 CHLMYD TRACH DNA AMP PROBE: CPT

## 2018-10-24 PROCEDURE — G0463: CPT | Mod: 25

## 2018-10-24 PROCEDURE — 86850 RBC ANTIBODY SCREEN: CPT

## 2018-10-24 PROCEDURE — 85027 COMPLETE CBC AUTOMATED: CPT

## 2018-10-24 PROCEDURE — 86480 TB TEST CELL IMMUN MEASURE: CPT

## 2018-10-24 PROCEDURE — G0452: CPT | Mod: 26

## 2018-10-24 PROCEDURE — 81329 SMN1 GENE DOS/DELETION ALYS: CPT

## 2018-10-24 PROCEDURE — 86780 TREPONEMA PALLIDUM: CPT

## 2018-10-24 PROCEDURE — 87389 HIV-1 AG W/HIV-1&-2 AB AG IA: CPT

## 2018-10-25 LAB
C TRACH RRNA SPEC QL NAA+PROBE: SIGNIFICANT CHANGE UP
CULTURE RESULTS: NO GROWTH — SIGNIFICANT CHANGE UP
LEAD BLD-MCNC: <1 UG/DL — SIGNIFICANT CHANGE UP (ref 0–4)
N GONORRHOEA RRNA SPEC QL NAA+PROBE: SIGNIFICANT CHANGE UP
SPECIMEN SOURCE: SIGNIFICANT CHANGE UP
SPECIMEN SOURCE: SIGNIFICANT CHANGE UP

## 2018-10-26 LAB
GAMMA INTERFERON BACKGROUND BLD IA-ACNC: 0.04 IU/ML — SIGNIFICANT CHANGE UP
HEMOGLOBIN INTERPRETATION: SIGNIFICANT CHANGE UP
HGB A MFR BLD: 97.1 % — SIGNIFICANT CHANGE UP (ref 95.8–98)
HGB A2 MFR BLD: 2.9 % — SIGNIFICANT CHANGE UP (ref 2–3.2)
M TB IFN-G BLD-IMP: POSITIVE
M TB IFN-G CD4+ BCKGRND COR BLD-ACNC: 0.71 IU/ML — SIGNIFICANT CHANGE UP
M TB IFN-G CD4+CD8+ BCKGRND COR BLD-ACNC: 0.66 IU/ML — SIGNIFICANT CHANGE UP
QUANT TB PLUS MITOGEN MINUS NIL: >10 IU/ML — SIGNIFICANT CHANGE UP

## 2018-10-27 LAB — SPINAL MUSCULAR ATROPHY: NEGATIVE — SIGNIFICANT CHANGE UP

## 2018-10-28 LAB — CYTOLOGY SPEC DOC CYTO: SIGNIFICANT CHANGE UP

## 2018-10-30 LAB — FRAGILE X PROTEIN (FMRP) PNL BLD: SIGNIFICANT CHANGE UP

## 2018-10-31 LAB — CYSTIC FIBROSIS EXPANDED PANEL: SIGNIFICANT CHANGE UP

## 2018-11-05 ENCOUNTER — NON-APPOINTMENT (OUTPATIENT)
Age: 28
End: 2018-11-05

## 2018-11-07 ENCOUNTER — OUTPATIENT (OUTPATIENT)
Dept: OUTPATIENT SERVICES | Facility: HOSPITAL | Age: 28
LOS: 1 days | End: 2018-11-07
Payer: MEDICAID

## 2018-11-07 ENCOUNTER — APPOINTMENT (OUTPATIENT)
Dept: OBGYN | Facility: CLINIC | Age: 28
End: 2018-11-07
Payer: MEDICAID

## 2018-11-07 VITALS — BODY MASS INDEX: 24.04 KG/M2 | WEIGHT: 119 LBS | DIASTOLIC BLOOD PRESSURE: 60 MMHG | SYSTOLIC BLOOD PRESSURE: 102 MMHG

## 2018-11-07 DIAGNOSIS — Z34.80 ENCOUNTER FOR SUPERVISION OF OTHER NORMAL PREGNANCY, UNSPECIFIED TRIMESTER: ICD-10-CM

## 2018-11-07 PROCEDURE — 81003 URINALYSIS AUTO W/O SCOPE: CPT

## 2018-11-07 PROCEDURE — G0463: CPT

## 2018-11-07 PROCEDURE — 82677 ASSAY OF ESTRIOL: CPT

## 2018-11-07 PROCEDURE — 81003 URINALYSIS AUTO W/O SCOPE: CPT | Mod: NC,QW

## 2018-11-07 PROCEDURE — 84704 HCG FREE BETACHAIN TEST: CPT

## 2018-11-07 PROCEDURE — 82105 ALPHA-FETOPROTEIN SERUM: CPT

## 2018-11-07 PROCEDURE — 99213 OFFICE O/P EST LOW 20 MIN: CPT | Mod: NC,TH

## 2018-11-07 PROCEDURE — 84702 CHORIONIC GONADOTROPIN TEST: CPT

## 2018-11-07 PROCEDURE — 86336 INHIBIN A: CPT

## 2018-11-12 LAB
1ST TRIMESTER DATA: SIGNIFICANT CHANGE UP
2ND TRIMESTER DATA: SIGNIFICANT CHANGE UP
AFP AMN-MCNC: SIGNIFICANT CHANGE UP
AFP SERPL-ACNC: SIGNIFICANT CHANGE UP
B-HCG FREE SERPL-MCNC: SIGNIFICANT CHANGE UP
B-HCG FREE SERPL-MCNC: SIGNIFICANT CHANGE UP
CLINICAL BIOCHEMIST REVIEW: SIGNIFICANT CHANGE UP
DEMOGRAPHIC DATA: SIGNIFICANT CHANGE UP
INHIBIN A SERPL-MCNC: SIGNIFICANT CHANGE UP
NT: SIGNIFICANT CHANGE UP
PAPP-A SERPL-ACNC: SIGNIFICANT CHANGE UP
SCREEN-FOOTER: SIGNIFICANT CHANGE UP
U ESTRIOL SERPL-SCNC: SIGNIFICANT CHANGE UP

## 2018-11-13 DIAGNOSIS — Z34.82 ENCOUNTER FOR SUPERVISION OF OTHER NORMAL PREGNANCY, SECOND TRIMESTER: ICD-10-CM

## 2018-11-13 DIAGNOSIS — Z34.90 ENCOUNTER FOR SUPERVISION OF NORMAL PREGNANCY, UNSPECIFIED, UNSPECIFIED TRIMESTER: ICD-10-CM

## 2018-11-21 ENCOUNTER — NON-APPOINTMENT (OUTPATIENT)
Age: 28
End: 2018-11-21

## 2018-11-21 ENCOUNTER — APPOINTMENT (OUTPATIENT)
Dept: OBGYN | Facility: CLINIC | Age: 28
End: 2018-11-21
Payer: MEDICAID

## 2018-11-21 ENCOUNTER — OUTPATIENT (OUTPATIENT)
Dept: OUTPATIENT SERVICES | Facility: HOSPITAL | Age: 28
LOS: 1 days | End: 2018-11-21
Payer: MEDICAID

## 2018-11-21 DIAGNOSIS — Z34.80 ENCOUNTER FOR SUPERVISION OF OTHER NORMAL PREGNANCY, UNSPECIFIED TRIMESTER: ICD-10-CM

## 2018-11-21 DIAGNOSIS — B37.3 CANDIDIASIS OF VULVA AND VAGINA: ICD-10-CM

## 2018-11-21 DIAGNOSIS — Z34.90 ENCOUNTER FOR SUPERVISION OF NORMAL PREGNANCY, UNSPECIFIED, UNSPECIFIED TRIMESTER: ICD-10-CM

## 2018-11-21 PROCEDURE — G0463: CPT

## 2018-11-21 PROCEDURE — 81003 URINALYSIS AUTO W/O SCOPE: CPT | Mod: NC,QW

## 2018-11-21 PROCEDURE — 81003 URINALYSIS AUTO W/O SCOPE: CPT

## 2018-11-21 PROCEDURE — 99213 OFFICE O/P EST LOW 20 MIN: CPT | Mod: NC,TH

## 2018-11-21 PROCEDURE — 87210 SMEAR WET MOUNT SALINE/INK: CPT | Mod: NC,QW

## 2018-11-26 ENCOUNTER — RX RENEWAL (OUTPATIENT)
Age: 28
End: 2018-11-26

## 2018-12-03 ENCOUNTER — EMERGENCY (EMERGENCY)
Facility: HOSPITAL | Age: 28
LOS: 1 days | Discharge: HOME | End: 2018-12-03
Attending: STUDENT IN AN ORGANIZED HEALTH CARE EDUCATION/TRAINING PROGRAM
Payer: MEDICAID

## 2018-12-03 VITALS
HEART RATE: 92 BPM | RESPIRATION RATE: 18 BRPM | WEIGHT: 119.93 LBS | DIASTOLIC BLOOD PRESSURE: 78 MMHG | OXYGEN SATURATION: 98 % | TEMPERATURE: 98 F | SYSTOLIC BLOOD PRESSURE: 112 MMHG

## 2018-12-03 PROCEDURE — 93010 ELECTROCARDIOGRAM REPORT: CPT

## 2018-12-03 PROCEDURE — 99284 EMERGENCY DEPT VISIT MOD MDM: CPT | Mod: 25

## 2018-12-03 NOTE — ED ADULT TRIAGE NOTE - CHIEF COMPLAINT QUOTE
3-4 days of difficulty taking a big deep breath in, 4 months preg. denies cough/fever/chills/long car rides or flights/leg swelling. also reports L hip pain 1 week ago.

## 2018-12-04 VITALS
HEART RATE: 95 BPM | TEMPERATURE: 99 F | DIASTOLIC BLOOD PRESSURE: 62 MMHG | SYSTOLIC BLOOD PRESSURE: 99 MMHG | OXYGEN SATURATION: 99 % | RESPIRATION RATE: 16 BRPM

## 2018-12-04 LAB
ALBUMIN SERPL ELPH-MCNC: 3.6 G/DL — SIGNIFICANT CHANGE UP (ref 3.3–5)
ALP SERPL-CCNC: 71 U/L — SIGNIFICANT CHANGE UP (ref 40–120)
ALT FLD-CCNC: 18 U/L — SIGNIFICANT CHANGE UP (ref 10–45)
ANION GAP SERPL CALC-SCNC: 14 MMOL/L — SIGNIFICANT CHANGE UP (ref 5–17)
APPEARANCE UR: ABNORMAL
AST SERPL-CCNC: 16 U/L — SIGNIFICANT CHANGE UP (ref 10–40)
BACTERIA # UR AUTO: ABNORMAL
BASOPHILS # BLD AUTO: 0.1 K/UL — SIGNIFICANT CHANGE UP (ref 0–0.2)
BASOPHILS NFR BLD AUTO: 0.7 % — SIGNIFICANT CHANGE UP (ref 0–2)
BILIRUB SERPL-MCNC: 0.1 MG/DL — LOW (ref 0.2–1.2)
BILIRUB UR-MCNC: NEGATIVE — SIGNIFICANT CHANGE UP
BUN SERPL-MCNC: 5 MG/DL — LOW (ref 7–23)
CALCIUM SERPL-MCNC: 8.8 MG/DL — SIGNIFICANT CHANGE UP (ref 8.4–10.5)
CHLORIDE SERPL-SCNC: 103 MMOL/L — SIGNIFICANT CHANGE UP (ref 96–108)
CO2 SERPL-SCNC: 21 MMOL/L — LOW (ref 22–31)
COLOR SPEC: YELLOW — SIGNIFICANT CHANGE UP
CREAT SERPL-MCNC: 0.46 MG/DL — LOW (ref 0.5–1.3)
D DIMER BLD IA.RAPID-MCNC: 309 NG/ML DDU — HIGH
DIFF PNL FLD: NEGATIVE — SIGNIFICANT CHANGE UP
EOSINOPHIL # BLD AUTO: 0.1 K/UL — SIGNIFICANT CHANGE UP (ref 0–0.5)
EOSINOPHIL NFR BLD AUTO: 1.2 % — SIGNIFICANT CHANGE UP (ref 0–6)
EPI CELLS # UR: 6 /HPF — HIGH
GLUCOSE SERPL-MCNC: 80 MG/DL — SIGNIFICANT CHANGE UP (ref 70–99)
GLUCOSE UR QL: NEGATIVE — SIGNIFICANT CHANGE UP
HCT VFR BLD CALC: 30.6 % — LOW (ref 34.5–45)
HGB BLD-MCNC: 10.7 G/DL — LOW (ref 11.5–15.5)
HYALINE CASTS # UR AUTO: 1 /LPF — SIGNIFICANT CHANGE UP (ref 0–2)
KETONES UR-MCNC: NEGATIVE — SIGNIFICANT CHANGE UP
LEUKOCYTE ESTERASE UR-ACNC: NEGATIVE — SIGNIFICANT CHANGE UP
LYMPHOCYTES # BLD AUTO: 3 K/UL — SIGNIFICANT CHANGE UP (ref 1–3.3)
LYMPHOCYTES # BLD AUTO: 31.1 % — SIGNIFICANT CHANGE UP (ref 13–44)
MCHC RBC-ENTMCNC: 27.1 PG — SIGNIFICANT CHANGE UP (ref 27–34)
MCHC RBC-ENTMCNC: 34.8 GM/DL — SIGNIFICANT CHANGE UP (ref 32–36)
MCV RBC AUTO: 77.9 FL — LOW (ref 80–100)
MONOCYTES # BLD AUTO: 0.6 K/UL — SIGNIFICANT CHANGE UP (ref 0–0.9)
MONOCYTES NFR BLD AUTO: 6.6 % — SIGNIFICANT CHANGE UP (ref 2–14)
NEUTROPHILS # BLD AUTO: 5.8 K/UL — SIGNIFICANT CHANGE UP (ref 1.8–7.4)
NEUTROPHILS NFR BLD AUTO: 60.4 % — SIGNIFICANT CHANGE UP (ref 43–77)
NITRITE UR-MCNC: NEGATIVE — SIGNIFICANT CHANGE UP
PH UR: 5.5 — SIGNIFICANT CHANGE UP (ref 5–8)
PLATELET # BLD AUTO: 232 K/UL — SIGNIFICANT CHANGE UP (ref 150–400)
POTASSIUM SERPL-MCNC: 3.9 MMOL/L — SIGNIFICANT CHANGE UP (ref 3.5–5.3)
POTASSIUM SERPL-SCNC: 3.9 MMOL/L — SIGNIFICANT CHANGE UP (ref 3.5–5.3)
PROT SERPL-MCNC: 6.5 G/DL — SIGNIFICANT CHANGE UP (ref 6–8.3)
PROT UR-MCNC: SIGNIFICANT CHANGE UP
RBC # BLD: 3.93 M/UL — SIGNIFICANT CHANGE UP (ref 3.8–5.2)
RBC # FLD: 13.7 % — SIGNIFICANT CHANGE UP (ref 10.3–14.5)
RBC CASTS # UR COMP ASSIST: 10 /HPF — HIGH (ref 0–4)
SODIUM SERPL-SCNC: 138 MMOL/L — SIGNIFICANT CHANGE UP (ref 135–145)
SP GR SPEC: 1.02 — SIGNIFICANT CHANGE UP (ref 1.01–1.02)
UROBILINOGEN FLD QL: NEGATIVE — SIGNIFICANT CHANGE UP
WBC # BLD: 9.6 K/UL — SIGNIFICANT CHANGE UP (ref 3.8–10.5)
WBC # FLD AUTO: 9.6 K/UL — SIGNIFICANT CHANGE UP (ref 3.8–10.5)
WBC UR QL: 6 /HPF — HIGH (ref 0–5)

## 2018-12-04 PROCEDURE — 80053 COMPREHEN METABOLIC PANEL: CPT

## 2018-12-04 PROCEDURE — 81001 URINALYSIS AUTO W/SCOPE: CPT

## 2018-12-04 PROCEDURE — 71275 CT ANGIOGRAPHY CHEST: CPT | Mod: 26

## 2018-12-04 PROCEDURE — 93005 ELECTROCARDIOGRAM TRACING: CPT

## 2018-12-04 PROCEDURE — 99284 EMERGENCY DEPT VISIT MOD MDM: CPT | Mod: 25

## 2018-12-04 PROCEDURE — 85379 FIBRIN DEGRADATION QUANT: CPT

## 2018-12-04 PROCEDURE — 96360 HYDRATION IV INFUSION INIT: CPT | Mod: XU

## 2018-12-04 PROCEDURE — 85027 COMPLETE CBC AUTOMATED: CPT

## 2018-12-04 PROCEDURE — 71275 CT ANGIOGRAPHY CHEST: CPT

## 2018-12-04 RX ORDER — ACETAMINOPHEN 500 MG
650 TABLET ORAL ONCE
Qty: 0 | Refills: 0 | Status: COMPLETED | OUTPATIENT
Start: 2018-12-04 | End: 2018-12-04

## 2018-12-04 RX ORDER — SODIUM CHLORIDE 9 MG/ML
1000 INJECTION INTRAMUSCULAR; INTRAVENOUS; SUBCUTANEOUS ONCE
Qty: 0 | Refills: 0 | Status: COMPLETED | OUTPATIENT
Start: 2018-12-04 | End: 2018-12-04

## 2018-12-04 RX ADMIN — SODIUM CHLORIDE 1000 MILLILITER(S): 9 INJECTION INTRAMUSCULAR; INTRAVENOUS; SUBCUTANEOUS at 05:00

## 2018-12-04 RX ADMIN — SODIUM CHLORIDE 1000 MILLILITER(S): 9 INJECTION INTRAMUSCULAR; INTRAVENOUS; SUBCUTANEOUS at 06:10

## 2018-12-04 NOTE — ED PROVIDER NOTE - OBJECTIVE STATEMENT
27 y/o F  currently 4month preg, w/ no sig PMHx presents with SOB and pain w/ deep inspiration.   Patient reports developing the pain with deep inspiration 3-4 days ago. Also reports feeling SOB every 30mins or so during which she feels palpitations and inability to catch her breath. Also reports L lateral hip pain for the last week or so. Says it feels like her leg is catching when she walks. No LE edema.   Also reports having more difficulty breathing lying flat in better and feels better after sitting up.   Denies any cough, F/C, abd pain, dysuria or vaginal bleeding.   Of note patient had a vaginal infection w/ discharge for which she finish a 7 day Rx yestday. with no further discharge. 27 y/o F  currently 4month preg, w/ no sig PMHx presents with SOB and pain w/ deep inspiration.   Patient reports developing the pain with deep inspiration 3-4 days ago. Also reports feeling SOB every 30mins or so during which she feels palpitations and inability to catch her breath. Also reports L lateral hip pain for the last week or so. Says it feels like her leg is catching when she walks. No LE edema.   Also reports having more difficulty breathing lying flat in better and feels better after sitting up.   Denies any cough, F/C, abd pain, dysuria or vaginal bleeding.   Of note patient had a vaginal infection w/ discharge for which she finish a 7 day Rx yestday. with no further discharge.  Reports normal pregnancy so far.  [place] #[id number] 27 y/o F  currently 4month preg, w/ no sig PMHx presents with SOB and pain w/ deep inspiration.   Patient reports developing the pain with deep inspiration 3-4 days ago. Also reports feeling SOB every 30mins or so during which she feels palpitations and inability to catch her breath. Also reports L lateral hip pain for the last week or so. Says it feels like her leg is catching when she walks. No LE edema.   Also reports having more difficulty breathing lying flat in better and feels better after sitting up.   Denies any cough, F/C, abd pain, dysuria or vaginal bleeding.   Of note patient had a vaginal infection w/ discharge for which she finish a 7 day Rx yesterday. with no further discharge.  Reports normal pregnancy so far.  [place] #[id number]

## 2018-12-04 NOTE — ED PROVIDER NOTE - NSFOLLOWUPINSTRUCTIONS_ED_ALL_ED_FT
Please follow up with your PCP and obstetrician, and continue to use your incentive spirometer every 15 mins while awake and resting.

## 2018-12-04 NOTE — ED ADULT NURSE NOTE - OBJECTIVE STATEMENT
28F to ED c/o pain to L side chest and difficulty breathing when taking a breath, L side lower back pain near flank area. Patient denies history of kidney stone or UTI. Patient denies dysuria at this time. Patient is pregnant. Patient is primarily Lithuanian speaking. Family member at bedside. Patient is alert and oriented X4, calm/cooperative, NAD, VSS. Pt has 18Ga to L AC.

## 2018-12-04 NOTE — ED PROVIDER NOTE - PHYSICAL EXAMINATION
PHYSICAL EXAM:    GENERAL: Comfortable, no acute distress   HEAD:  Normocephalic, atraumatic  EYES: EOMI, PERRLA  HEENT: Moist mucous membranes  NECK: Supple, No JVD  NERVOUS SYSTEM:  Alert & Oriented X3, Motor Strength 5/5 B/L upper and lower extremities  CHEST/LUNG: Clear to auscultation bilaterally  HEART: Regular rate and rhythm, no murmur   ABDOMEN: Soft, Nontender, Bowel sounds present, + distension   EXTREMITIES:   No clubbing, cyanosis, or edema  MUSCULOSKELETAL: No muscle tenderness, no joint tenderness, full ROM of b/l knees and hips  SKIN:  warm and dry, no rash

## 2018-12-04 NOTE — ED PROVIDER NOTE - MEDICAL DECISION MAKING DETAILS
29 y/o F  currently 4month preg, w/ no sig PMHx presents with SOB and pain w/ deep inspiration. HD stable, but concern for PE, will check labs, D-dimer, UA and discuss CTA 27 y/o F  currently 4month preg, w/ no sig PMHx presents with SOB and pain w/ deep inspiration. HD stable, but concern for PE, will check labs, D-dimer, UA and discuss CTA  Will Leon DO: 29yo F [id number] with sob and pleuritic cp. no uri symptoms. exam as above. given pregnancy and symptoms will eval for PE. labs, cta.

## 2018-12-04 NOTE — ED PROVIDER NOTE - PROGRESS NOTE DETAILS
Dru Horne PGY3 - explained risks and benefits of CTA to patient with [place]ian pacific  Nichole #[id number]. Patient agreed to CTA Dru Horen PGY3 - fetal , reviewed CTA results, gave incentive spirometer and instructed on use Dru Horne PGY3 - fetal , reviewed CTA results, gave incentive spirometer and instructed on use  Will Leon DO: cta neg. pt feeling better. stable for dc with gyn f/u

## 2018-12-04 NOTE — ED ADULT NURSE NOTE - NS_ED_NURSE_TEACHING_TOPIC_ED_A_ED
Respiratory/Pt discharged, VSS, afebrile, ambulating independently. Nurse clearly reviewed discharge instructions, followup care, and when to return to the ED - Pt verbalized understanding. Instructions printed and given in [facility]

## 2018-12-05 ENCOUNTER — APPOINTMENT (OUTPATIENT)
Dept: ANTEPARTUM | Facility: CLINIC | Age: 28
End: 2018-12-05
Payer: MEDICAID

## 2018-12-05 ENCOUNTER — ASOB RESULT (OUTPATIENT)
Age: 28
End: 2018-12-05

## 2018-12-05 ENCOUNTER — OUTPATIENT (OUTPATIENT)
Dept: OUTPATIENT SERVICES | Facility: HOSPITAL | Age: 28
LOS: 1 days | End: 2018-12-05
Payer: MEDICAID

## 2018-12-05 ENCOUNTER — APPOINTMENT (OUTPATIENT)
Dept: OBGYN | Facility: CLINIC | Age: 28
End: 2018-12-05
Payer: MEDICAID

## 2018-12-05 VITALS — SYSTOLIC BLOOD PRESSURE: 104 MMHG | BODY MASS INDEX: 24.44 KG/M2 | WEIGHT: 121 LBS | DIASTOLIC BLOOD PRESSURE: 68 MMHG

## 2018-12-05 DIAGNOSIS — Z34.80 ENCOUNTER FOR SUPERVISION OF OTHER NORMAL PREGNANCY, UNSPECIFIED TRIMESTER: ICD-10-CM

## 2018-12-05 PROCEDURE — 76817 TRANSVAGINAL US OBSTETRIC: CPT | Mod: 59

## 2018-12-05 PROCEDURE — G0463: CPT

## 2018-12-05 PROCEDURE — 99213 OFFICE O/P EST LOW 20 MIN: CPT | Mod: NC,TH

## 2018-12-05 PROCEDURE — 81003 URINALYSIS AUTO W/O SCOPE: CPT | Mod: NC,QW

## 2018-12-05 PROCEDURE — 81003 URINALYSIS AUTO W/O SCOPE: CPT

## 2018-12-05 PROCEDURE — 76811 OB US DETAILED SNGL FETUS: CPT

## 2018-12-06 DIAGNOSIS — Z34.82 ENCOUNTER FOR SUPERVISION OF OTHER NORMAL PREGNANCY, SECOND TRIMESTER: ICD-10-CM

## 2018-12-07 ENCOUNTER — EMERGENCY (EMERGENCY)
Facility: HOSPITAL | Age: 28
LOS: 1 days | Discharge: HOME | End: 2018-12-07
Attending: EMERGENCY MEDICINE | Admitting: EMERGENCY MEDICINE
Payer: MEDICAID

## 2018-12-07 VITALS
DIASTOLIC BLOOD PRESSURE: 76 MMHG | HEART RATE: 115 BPM | HEIGHT: 63 IN | OXYGEN SATURATION: 99 % | TEMPERATURE: 98 F | RESPIRATION RATE: 18 BRPM | WEIGHT: 125 LBS | SYSTOLIC BLOOD PRESSURE: 119 MMHG

## 2018-12-07 VITALS
OXYGEN SATURATION: 99 % | HEART RATE: 95 BPM | RESPIRATION RATE: 16 BRPM | SYSTOLIC BLOOD PRESSURE: 100 MMHG | DIASTOLIC BLOOD PRESSURE: 51 MMHG

## 2018-12-07 DIAGNOSIS — R10.9 UNSPECIFIED ABDOMINAL PAIN: ICD-10-CM

## 2018-12-07 LAB
ALBUMIN SERPL ELPH-MCNC: 2.8 G/DL — LOW (ref 3.3–5)
ALP SERPL-CCNC: 82 U/L — SIGNIFICANT CHANGE UP (ref 40–120)
ALT FLD-CCNC: 33 U/L DA — SIGNIFICANT CHANGE UP (ref 10–45)
ANION GAP SERPL CALC-SCNC: 8 MMOL/L — SIGNIFICANT CHANGE UP (ref 5–17)
AST SERPL-CCNC: 24 U/L — SIGNIFICANT CHANGE UP (ref 10–40)
BASOPHILS # BLD AUTO: 0.1 K/UL — SIGNIFICANT CHANGE UP (ref 0–0.2)
BASOPHILS NFR BLD AUTO: 0.5 % — SIGNIFICANT CHANGE UP (ref 0–2)
BILIRUB SERPL-MCNC: 0.1 MG/DL — LOW (ref 0.2–1.2)
BUN SERPL-MCNC: 7 MG/DL — SIGNIFICANT CHANGE UP (ref 7–23)
CALCIUM SERPL-MCNC: 8.3 MG/DL — LOW (ref 8.4–10.5)
CHLORIDE SERPL-SCNC: 103 MMOL/L — SIGNIFICANT CHANGE UP (ref 96–108)
CO2 SERPL-SCNC: 22 MMOL/L — SIGNIFICANT CHANGE UP (ref 22–31)
CREAT SERPL-MCNC: 0.63 MG/DL — SIGNIFICANT CHANGE UP (ref 0.5–1.3)
EOSINOPHIL # BLD AUTO: 0 K/UL — SIGNIFICANT CHANGE UP (ref 0–0.5)
EOSINOPHIL NFR BLD AUTO: 0.2 % — SIGNIFICANT CHANGE UP (ref 0–6)
GLUCOSE SERPL-MCNC: 88 MG/DL — SIGNIFICANT CHANGE UP (ref 70–99)
HCT VFR BLD CALC: 34.9 % — SIGNIFICANT CHANGE UP (ref 34.5–45)
HGB BLD-MCNC: 11.6 G/DL — SIGNIFICANT CHANGE UP (ref 11.5–15.5)
LIDOCAIN IGE QN: 170 U/L — SIGNIFICANT CHANGE UP (ref 73–393)
LYMPHOCYTES # BLD AUTO: 1.5 K/UL — SIGNIFICANT CHANGE UP (ref 1–3.3)
LYMPHOCYTES # BLD AUTO: 8.3 % — LOW (ref 13–44)
MCHC RBC-ENTMCNC: 26.9 PG — LOW (ref 27–34)
MCHC RBC-ENTMCNC: 33.4 GM/DL — SIGNIFICANT CHANGE UP (ref 32–36)
MCV RBC AUTO: 80.7 FL — SIGNIFICANT CHANGE UP (ref 80–100)
MONOCYTES # BLD AUTO: 1 K/UL — HIGH (ref 0–0.9)
MONOCYTES NFR BLD AUTO: 5.3 % — SIGNIFICANT CHANGE UP (ref 2–14)
NEUTROPHILS # BLD AUTO: 15.8 K/UL — HIGH (ref 1.8–7.4)
NEUTROPHILS NFR BLD AUTO: 85.8 % — HIGH (ref 43–77)
PLATELET # BLD AUTO: 238 K/UL — SIGNIFICANT CHANGE UP (ref 150–400)
POTASSIUM SERPL-MCNC: 3.6 MMOL/L — SIGNIFICANT CHANGE UP (ref 3.5–5.3)
POTASSIUM SERPL-SCNC: 3.6 MMOL/L — SIGNIFICANT CHANGE UP (ref 3.5–5.3)
PROT SERPL-MCNC: 7.1 G/DL — SIGNIFICANT CHANGE UP (ref 6–8.3)
RBC # BLD: 4.33 M/UL — SIGNIFICANT CHANGE UP (ref 3.8–5.2)
RBC # FLD: 13.5 % — SIGNIFICANT CHANGE UP (ref 10.3–14.5)
SODIUM SERPL-SCNC: 133 MMOL/L — LOW (ref 135–145)
WBC # BLD: 18.5 K/UL — HIGH (ref 3.8–10.5)
WBC # FLD AUTO: 18.5 K/UL — HIGH (ref 3.8–10.5)

## 2018-12-07 PROCEDURE — 99284 EMERGENCY DEPT VISIT MOD MDM: CPT

## 2018-12-07 PROCEDURE — 96374 THER/PROPH/DIAG INJ IV PUSH: CPT

## 2018-12-07 PROCEDURE — 99284 EMERGENCY DEPT VISIT MOD MDM: CPT | Mod: 25

## 2018-12-07 PROCEDURE — 96361 HYDRATE IV INFUSION ADD-ON: CPT

## 2018-12-07 PROCEDURE — 83690 ASSAY OF LIPASE: CPT

## 2018-12-07 PROCEDURE — 85027 COMPLETE CBC AUTOMATED: CPT

## 2018-12-07 PROCEDURE — 80053 COMPREHEN METABOLIC PANEL: CPT

## 2018-12-07 RX ORDER — METOCLOPRAMIDE HCL 10 MG
10 TABLET ORAL ONCE
Qty: 0 | Refills: 0 | Status: COMPLETED | OUTPATIENT
Start: 2018-12-07 | End: 2018-12-07

## 2018-12-07 RX ORDER — SODIUM CHLORIDE 9 MG/ML
1000 INJECTION INTRAMUSCULAR; INTRAVENOUS; SUBCUTANEOUS ONCE
Qty: 0 | Refills: 0 | Status: COMPLETED | OUTPATIENT
Start: 2018-12-07 | End: 2018-12-07

## 2018-12-07 RX ADMIN — Medication 10 MILLIGRAM(S): at 17:03

## 2018-12-07 RX ADMIN — SODIUM CHLORIDE 1000 MILLILITER(S): 9 INJECTION INTRAMUSCULAR; INTRAVENOUS; SUBCUTANEOUS at 18:08

## 2018-12-07 RX ADMIN — SODIUM CHLORIDE 1000 MILLILITER(S): 9 INJECTION INTRAMUSCULAR; INTRAVENOUS; SUBCUTANEOUS at 17:03

## 2018-12-07 RX ADMIN — SODIUM CHLORIDE 1000 MILLILITER(S): 9 INJECTION INTRAMUSCULAR; INTRAVENOUS; SUBCUTANEOUS at 18:09

## 2018-12-07 RX ADMIN — SODIUM CHLORIDE 1000 MILLILITER(S): 9 INJECTION INTRAMUSCULAR; INTRAVENOUS; SUBCUTANEOUS at 18:50

## 2018-12-07 NOTE — ED PROVIDER NOTE - CHPI ED SYMPTOMS NEG
no chills/no abdominal distension/no blood in stool/no dysuria/no hematuria/no fever/no burning urination

## 2018-12-07 NOTE — ED PROVIDER NOTE - NSFOLLOWUPCLINICS_GEN_ALL_ED_FT
[facility]  Gynceology/OB  [address] [place] [address]  Phone: [phone]  Fax:   Follow Up Time:

## 2018-12-07 NOTE — ED PROVIDER NOTE - PLAN OF CARE
Call the clinic tomorrow and make an appointment to be seen as soon as possible. Rest, increase your fluids. Do not engage in an strenuous activity. Worsening, continued or ANY new concerning symptoms return to the emergency department.

## 2018-12-07 NOTE — ED PROVIDER NOTE - CARE PLAN
Principal Discharge DX:	Gastroenteritis  Secondary Diagnosis:	21 weeks gestation of pregnancy Principal Discharge DX:	Gastroenteritis  Assessment and plan of treatment:	Call the clinic tomorrow and make an appointment to be seen as soon as possible. Rest, increase your fluids. Do not engage in an strenuous activity. Worsening, continued or ANY new concerning symptoms return to the emergency department.  Secondary Diagnosis:	21 weeks gestation of pregnancy Principal Discharge DX:	Nausea, vomiting, and diarrhea  Secondary Diagnosis:	21 weeks gestation of pregnancy

## 2018-12-07 NOTE — ED ADULT NURSE NOTE - OBJECTIVE STATEMENT
Pt c/o generalized abd pain and vomiting starting one hour PTA. Pt states she is 21 weeks pregnant. Pt denies vaginal bleeding.

## 2018-12-07 NOTE — ED PROVIDER NOTE - PROGRESS NOTE DETAILS
SHEILA Hedrick- - discussion with our OBGYN Dr. Akhtar- because she is over 20 weeks recommends transfer for fetal monitoring. Will initiate. SHEILA Hedrick- spoke with Transfer center who connected me to [facility] OBGYN Dr. Arechiga. Case reviewed including WBC 18- as per protocol as long as FHR WNL, fetal heart rate monitored is not required at 21 weeks. LFTs, Lipase, Platelets, WNL.  Dr. Arechiga able to look her up in the system- had a comprehensive US 2 days ago all WNL. Pt feeling better s/p fluids and Reglan. As per Dr. Arechiga pt instructed to call clinic tomorrow morning to make a follow up appt for Monday. Repeat vitals stable. Strict return precautions discussed including decreased fetal movement, vaginal bleeding/fluid leakage, continued/worsening or ANY NEW CONCERNING symptoms. PA Tiberio- pt reassessed. States significant improvement with fluids and Reglan. No vomiting nor diarrhea here. Pain improved. Abdomen re-examined. No point tenderness upon palpation. Negative psoas, negative murphys, negative mcburney's point tenderness. Strict return precautions discussed and translated via .

## 2018-12-07 NOTE — ED ADULT TRIAGE NOTE - NS ED NURSE DIRECT TO ROOM YN
No

## 2018-12-07 NOTE — ED PROVIDER NOTE - NSFOLLOWUPINSTRUCTIONS_ED_ALL_ED_FT
Call the clinic tomorrow and make an appointment to be seen as soon as possible. Rest, increase your fluids. Do not engage in any strenuous activity. Worsening, continued or ANY new concerning symptoms return to the emergency department.

## 2018-12-07 NOTE — ED PROVIDER NOTE - ATTENDING CONTRIBUTION TO CARE
Dr. Rice: I performed a face to face bedside interview with patient regarding history of present illness, review of symptoms and past medical history. I completed an independent physical exam.  I have discussed patient's plan of care with PA.   I agree with note as stated above, having amended the EMR as needed to reflect my findings.   This includes HISTORY OF PRESENT ILLNESS, HIV, PAST MEDICAL/SURGICAL/FAMILY/SOCIAL HISTORY, ALLERGIES AND HOME MEDICATIONS, REVIEW OF SYSTEMS, PHYSICAL EXAM, and any PROGRESS NOTES during the time I functioned as the attending physician for this patient.    28F F , 21 weeks pregnant, p/w multiple episodes of N/V/D (NBNB, watery) s/p eating pork this morning. No abdo pain, fevers, chills, vag bleeding, vag, discharge, dysuria, hematuria. Had normal US last week at [facility]. Feels baby move.    On exam pt is well appearing, nad, abdo soft/nt/nd, gravid    Plan - labs, iv hydration, ua, fhr, d/w ob

## 2018-12-07 NOTE — ED ADULT TRIAGE NOTE - CHIEF COMPLAINT QUOTE
Pt c/o abd pain and vomiting x1 hour and states she is 21 weeks pregnant. Pt denies vaginal bleeding.

## 2018-12-07 NOTE — ED PROVIDER NOTE - OBJECTIVE STATEMENT
29 y/o F no pmh  21 weeks pregnant presenting with nausea, non bilious emesis with epigastric discomfort after vomiting and water diarrhea s/p eating pork today. Feels the baby moving. Denies fever, chills, pelvic pain/cramping, vaginal bleeding, vaginal fluid leakage. Appears well. No distress.   Followed in OB/GYN clinic [place] 27 y/o F no pmh  21 weeks pregnant presenting with nausea, non bilious emesis with epigastric discomfort after vomiting and watery diarrhea s/p eating pork today. Feels the baby moving. Denies fever, chills, pelvic pain/cramping, vaginal bleeding, vaginal fluid leakage. Appears well. No distress.   Followed in OB/GYN clinic [place] 29 y/o Divehi speaking F (requests  to translate) no pmh  21 weeks pregnant presenting with nausea, non bilious emesis with epigastric discomfort after vomiting and watery diarrhea s/p eating pork today. Feels the baby moving. Denies fever, chills, pelvic pain/cramping, vaginal bleeding, vaginal fluid leakage. Appears well. No distress.   Followed in OB/GYN clinic [place]

## 2018-12-07 NOTE — ED PROVIDER NOTE - MEDICAL DECISION MAKING DETAILS
27 y/o F no pmh  presenting with n/v/d s/p eating prok today- FHR check, labs, fluids, antiemetics consider transfer for fetal monitoring s/p discussion with our OB

## 2018-12-12 ENCOUNTER — APPOINTMENT (OUTPATIENT)
Dept: OBGYN | Facility: CLINIC | Age: 28
End: 2018-12-12
Payer: MEDICAID

## 2018-12-12 ENCOUNTER — OUTPATIENT (OUTPATIENT)
Dept: OUTPATIENT SERVICES | Facility: HOSPITAL | Age: 28
LOS: 1 days | End: 2018-12-12
Payer: MEDICAID

## 2018-12-12 VITALS — WEIGHT: 127 LBS | SYSTOLIC BLOOD PRESSURE: 98 MMHG | DIASTOLIC BLOOD PRESSURE: 50 MMHG | BODY MASS INDEX: 25.65 KG/M2

## 2018-12-12 DIAGNOSIS — Z34.80 ENCOUNTER FOR SUPERVISION OF OTHER NORMAL PREGNANCY, UNSPECIFIED TRIMESTER: ICD-10-CM

## 2018-12-12 PROCEDURE — 99213 OFFICE O/P EST LOW 20 MIN: CPT | Mod: GC,TH

## 2018-12-12 PROCEDURE — G0463: CPT

## 2018-12-19 DIAGNOSIS — Z34.92 ENCOUNTER FOR SUPERVISION OF NORMAL PREGNANCY, UNSPECIFIED, SECOND TRIMESTER: ICD-10-CM

## 2019-01-08 ENCOUNTER — OUTPATIENT (OUTPATIENT)
Dept: OUTPATIENT SERVICES | Facility: HOSPITAL | Age: 29
LOS: 1 days | End: 2019-01-08
Payer: MEDICAID

## 2019-01-08 ENCOUNTER — LABORATORY RESULT (OUTPATIENT)
Age: 29
End: 2019-01-08

## 2019-01-08 ENCOUNTER — APPOINTMENT (OUTPATIENT)
Dept: OBGYN | Facility: CLINIC | Age: 29
End: 2019-01-08
Payer: MEDICAID

## 2019-01-08 VITALS — WEIGHT: 131 LBS | BODY MASS INDEX: 26.46 KG/M2 | DIASTOLIC BLOOD PRESSURE: 60 MMHG | SYSTOLIC BLOOD PRESSURE: 104 MMHG

## 2019-01-08 DIAGNOSIS — Z34.80 ENCOUNTER FOR SUPERVISION OF OTHER NORMAL PREGNANCY, UNSPECIFIED TRIMESTER: ICD-10-CM

## 2019-01-08 PROCEDURE — 82239 BILE ACIDS TOTAL: CPT

## 2019-01-08 PROCEDURE — 99213 OFFICE O/P EST LOW 20 MIN: CPT | Mod: GE,TH

## 2019-01-08 PROCEDURE — G0463: CPT

## 2019-01-08 PROCEDURE — 83789 MASS SPECTROMETRY QUAL/QUAN: CPT

## 2019-01-08 PROCEDURE — 80048 BASIC METABOLIC PNL TOTAL CA: CPT

## 2019-01-08 PROCEDURE — 82950 GLUCOSE TEST: CPT

## 2019-01-09 LAB — GLUCOSE 1H P MEAL SERPL-MCNC: 138 MG/DL — HIGH (ref 70–134)

## 2019-01-10 LAB
ANION GAP SERPL CALC-SCNC: 11 MMOL/L — SIGNIFICANT CHANGE UP (ref 5–17)
BUN SERPL-MCNC: 7 MG/DL — SIGNIFICANT CHANGE UP (ref 7–23)
CALCIUM SERPL-MCNC: 8.9 MG/DL — SIGNIFICANT CHANGE UP (ref 8.4–10.5)
CHLORIDE SERPL-SCNC: 104 MMOL/L — SIGNIFICANT CHANGE UP (ref 96–108)
CO2 SERPL-SCNC: 22 MMOL/L — SIGNIFICANT CHANGE UP (ref 22–31)
CREAT SERPL-MCNC: 0.46 MG/DL — LOW (ref 0.5–1.3)
GLUCOSE SERPL-MCNC: 143 MG/DL — HIGH (ref 70–99)
POTASSIUM SERPL-MCNC: 3.7 MMOL/L — SIGNIFICANT CHANGE UP (ref 3.5–5.3)
POTASSIUM SERPL-SCNC: 3.7 MMOL/L — SIGNIFICANT CHANGE UP (ref 3.5–5.3)
SODIUM SERPL-SCNC: 137 MMOL/L — SIGNIFICANT CHANGE UP (ref 135–145)

## 2019-01-12 LAB — BILE AC FLD-MCNC: 5.6 UMOL/L — SIGNIFICANT CHANGE UP (ref 4.7–24.5)

## 2019-01-14 DIAGNOSIS — Z34.92 ENCOUNTER FOR SUPERVISION OF NORMAL PREGNANCY, UNSPECIFIED, SECOND TRIMESTER: ICD-10-CM

## 2019-01-15 LAB
BILE AC SERPL-SCNC: 4 UMOL/L — SIGNIFICANT CHANGE UP
CDCAE SER-MCNC: 0.9 UMOL/L — SIGNIFICANT CHANGE UP
CHOLATE SER-MCNC: 0.5 UMOL/L — SIGNIFICANT CHANGE UP
DO-CHOLATE SER-MCNC: 2.6 UMOL/L — SIGNIFICANT CHANGE UP
URSODEOXYCHOLATE SERPL-SCNC: <0.1 UMOL/L — SIGNIFICANT CHANGE UP

## 2019-02-06 ENCOUNTER — NON-APPOINTMENT (OUTPATIENT)
Age: 29
End: 2019-02-06

## 2019-02-06 PROBLEM — B37.3 CANDIDA VAGINITIS: Status: RESOLVED | Noted: 2018-11-21 | Resolved: 2019-02-06

## 2019-02-07 ENCOUNTER — OUTPATIENT (OUTPATIENT)
Dept: OUTPATIENT SERVICES | Facility: HOSPITAL | Age: 29
LOS: 1 days | End: 2019-02-07
Payer: MEDICAID

## 2019-02-07 ENCOUNTER — APPOINTMENT (OUTPATIENT)
Dept: OBGYN | Facility: CLINIC | Age: 29
End: 2019-02-07
Payer: MEDICAID

## 2019-02-07 VITALS
DIASTOLIC BLOOD PRESSURE: 50 MMHG | SYSTOLIC BLOOD PRESSURE: 90 MMHG | BODY MASS INDEX: 27.01 KG/M2 | WEIGHT: 134 LBS | HEIGHT: 59 IN

## 2019-02-07 DIAGNOSIS — B37.3 CANDIDIASIS OF VULVA AND VAGINA: ICD-10-CM

## 2019-02-07 DIAGNOSIS — Z34.80 ENCOUNTER FOR SUPERVISION OF OTHER NORMAL PREGNANCY, UNSPECIFIED TRIMESTER: ICD-10-CM

## 2019-02-07 DIAGNOSIS — Z87.19 PERSONAL HISTORY OF OTHER DISEASES OF THE DIGESTIVE SYSTEM: ICD-10-CM

## 2019-02-07 PROCEDURE — 36415 COLL VENOUS BLD VENIPUNCTURE: CPT | Mod: NC

## 2019-02-07 PROCEDURE — 36415 COLL VENOUS BLD VENIPUNCTURE: CPT

## 2019-02-07 PROCEDURE — 90715 TDAP VACCINE 7 YRS/> IM: CPT | Mod: NC

## 2019-02-07 PROCEDURE — 81003 URINALYSIS AUTO W/O SCOPE: CPT | Mod: NC,QW

## 2019-02-07 PROCEDURE — 90471 IMMUNIZATION ADMIN: CPT | Mod: NC

## 2019-02-07 PROCEDURE — 81003 URINALYSIS AUTO W/O SCOPE: CPT

## 2019-02-07 PROCEDURE — G0463: CPT | Mod: 25

## 2019-02-07 PROCEDURE — 90471 IMMUNIZATION ADMIN: CPT

## 2019-02-07 PROCEDURE — 90715 TDAP VACCINE 7 YRS/> IM: CPT

## 2019-02-07 PROCEDURE — 99213 OFFICE O/P EST LOW 20 MIN: CPT | Mod: 25,TH

## 2019-02-12 DIAGNOSIS — Z34.92 ENCOUNTER FOR SUPERVISION OF NORMAL PREGNANCY, UNSPECIFIED, SECOND TRIMESTER: ICD-10-CM

## 2019-02-12 DIAGNOSIS — O99.810 ABNORMAL GLUCOSE COMPLICATING PREGNANCY: ICD-10-CM

## 2019-02-12 DIAGNOSIS — Z23 ENCOUNTER FOR IMMUNIZATION: ICD-10-CM

## 2019-02-19 LAB
BASOPHILS # BLD AUTO: 0.03 K/UL
BASOPHILS NFR BLD AUTO: 0.3 %
EOSINOPHIL # BLD AUTO: 0.05 K/UL
EOSINOPHIL NFR BLD AUTO: 0.6 %
GLUCOSE 1H P 100 G GLC PO SERPL-MCNC: 95 MG/DL
GLUCOSE 2H P CHAL SERPL-MCNC: 99 MG/DL
GLUCOSE 3H P CHAL SERPL-MCNC: 90 MG/DL
GLUCOSE BS SERPL-MCNC: 76 MG/DL
HCT VFR BLD CALC: 32.2 %
HGB BLD-MCNC: 9.8 G/DL
HIV1+2 AB SPEC QL IA.RAPID: NONREACTIVE
IMM GRANULOCYTES NFR BLD AUTO: 0.6 %
LYMPHOCYTES # BLD AUTO: 1.9 K/UL
LYMPHOCYTES NFR BLD AUTO: 21.1 %
MAN DIFF?: NORMAL
MCHC RBC-ENTMCNC: 23 PG
MCHC RBC-ENTMCNC: 30.4 GM/DL
MCV RBC AUTO: 75.6 FL
MONOCYTES # BLD AUTO: 0.44 K/UL
MONOCYTES NFR BLD AUTO: 4.9 %
NEUTROPHILS # BLD AUTO: 6.55 K/UL
NEUTROPHILS NFR BLD AUTO: 72.5 %
PLATELET # BLD AUTO: 228 K/UL
RBC # BLD: 4.26 M/UL
RBC # FLD: 15.8 %
WBC # FLD AUTO: 9.02 K/UL

## 2019-02-21 ENCOUNTER — APPOINTMENT (OUTPATIENT)
Dept: OBGYN | Facility: CLINIC | Age: 29
End: 2019-02-21
Payer: MEDICAID

## 2019-02-21 ENCOUNTER — OUTPATIENT (OUTPATIENT)
Dept: OUTPATIENT SERVICES | Facility: HOSPITAL | Age: 29
LOS: 1 days | End: 2019-02-21
Payer: MEDICAID

## 2019-02-21 VITALS
DIASTOLIC BLOOD PRESSURE: 70 MMHG | BODY MASS INDEX: 27.67 KG/M2 | WEIGHT: 137.25 LBS | HEIGHT: 59 IN | SYSTOLIC BLOOD PRESSURE: 100 MMHG

## 2019-02-21 DIAGNOSIS — Z34.92 ENCOUNTER FOR SUPERVISION OF NORMAL PREGNANCY, UNSPECIFIED, SECOND TRIMESTER: ICD-10-CM

## 2019-02-21 DIAGNOSIS — Z34.80 ENCOUNTER FOR SUPERVISION OF OTHER NORMAL PREGNANCY, UNSPECIFIED TRIMESTER: ICD-10-CM

## 2019-02-21 PROCEDURE — 81003 URINALYSIS AUTO W/O SCOPE: CPT

## 2019-02-21 PROCEDURE — 99213 OFFICE O/P EST LOW 20 MIN: CPT | Mod: NC,TH

## 2019-02-21 PROCEDURE — G0463: CPT

## 2019-02-21 PROCEDURE — 81003 URINALYSIS AUTO W/O SCOPE: CPT | Mod: NC,QW

## 2019-02-25 DIAGNOSIS — R76.12 NONSPECIFIC REACTION TO CELL MEDIATED IMMUNITY MEASUREMENT OF GAMMA INTERFERON ANTIGEN RESPONSE WITHOUT ACTIVE TUBERCULOSIS: ICD-10-CM

## 2019-02-25 DIAGNOSIS — Z34.93 ENCOUNTER FOR SUPERVISION OF NORMAL PREGNANCY, UNSPECIFIED, THIRD TRIMESTER: ICD-10-CM

## 2019-02-28 ENCOUNTER — APPOINTMENT (OUTPATIENT)
Dept: RADIOLOGY | Facility: HOSPITAL | Age: 29
End: 2019-02-28
Payer: MEDICAID

## 2019-02-28 ENCOUNTER — OUTPATIENT (OUTPATIENT)
Dept: OUTPATIENT SERVICES | Facility: HOSPITAL | Age: 29
LOS: 1 days | End: 2019-02-28
Payer: MEDICAID

## 2019-02-28 DIAGNOSIS — R76.12 NONSPECIFIC REACTION TO CELL MEDIATED IMMUNITY MEASUREMENT OF GAMMA INTERFERON ANTIGEN RESPONSE WITHOUT ACTIVE TUBERCULOSIS: ICD-10-CM

## 2019-02-28 PROCEDURE — 71045 X-RAY EXAM CHEST 1 VIEW: CPT | Mod: 26

## 2019-02-28 PROCEDURE — 71045 X-RAY EXAM CHEST 1 VIEW: CPT

## 2019-03-07 ENCOUNTER — NON-APPOINTMENT (OUTPATIENT)
Age: 29
End: 2019-03-07

## 2019-03-07 ENCOUNTER — OUTPATIENT (OUTPATIENT)
Dept: OUTPATIENT SERVICES | Facility: HOSPITAL | Age: 29
LOS: 1 days | End: 2019-03-07
Payer: MEDICAID

## 2019-03-07 ENCOUNTER — APPOINTMENT (OUTPATIENT)
Dept: OBGYN | Facility: CLINIC | Age: 29
End: 2019-03-07
Payer: MEDICAID

## 2019-03-07 VITALS
BODY MASS INDEX: 28.02 KG/M2 | HEIGHT: 59 IN | DIASTOLIC BLOOD PRESSURE: 67 MMHG | WEIGHT: 139 LBS | SYSTOLIC BLOOD PRESSURE: 100 MMHG

## 2019-03-07 DIAGNOSIS — Z34.80 ENCOUNTER FOR SUPERVISION OF OTHER NORMAL PREGNANCY, UNSPECIFIED TRIMESTER: ICD-10-CM

## 2019-03-07 PROCEDURE — 99213 OFFICE O/P EST LOW 20 MIN: CPT | Mod: GE

## 2019-03-07 PROCEDURE — G0463: CPT

## 2019-03-13 NOTE — ED ADULT NURSE NOTE - CAS EDN DISCHARGE ASSESSMENT
Problem: Adult Inpatient Plan of Care  Goal: Plan of Care Review  Outcome: Revised  POC reviewed with Angel at 0500. Pt verbalized understanding. Questions and concerns addressed. No acute events overnight. Pt tolerated well 3 hour dialysis treatment with 1 liter of net UF. Pt progressing toward goals. Will continue to monitor. See flowsheets for full assessment and VS info        Alert and oriented to person, place and time

## 2019-03-14 DIAGNOSIS — Z34.93 ENCOUNTER FOR SUPERVISION OF NORMAL PREGNANCY, UNSPECIFIED, THIRD TRIMESTER: ICD-10-CM

## 2019-03-21 ENCOUNTER — NON-APPOINTMENT (OUTPATIENT)
Age: 29
End: 2019-03-21

## 2019-03-21 ENCOUNTER — LABORATORY RESULT (OUTPATIENT)
Age: 29
End: 2019-03-21

## 2019-03-21 ENCOUNTER — OUTPATIENT (OUTPATIENT)
Dept: OUTPATIENT SERVICES | Facility: HOSPITAL | Age: 29
LOS: 1 days | End: 2019-03-21
Payer: MEDICAID

## 2019-03-21 ENCOUNTER — APPOINTMENT (OUTPATIENT)
Dept: OBGYN | Facility: CLINIC | Age: 29
End: 2019-03-21
Payer: MEDICAID

## 2019-03-21 VITALS
HEIGHT: 59 IN | DIASTOLIC BLOOD PRESSURE: 70 MMHG | WEIGHT: 145 LBS | SYSTOLIC BLOOD PRESSURE: 112 MMHG | BODY MASS INDEX: 29.23 KG/M2

## 2019-03-21 DIAGNOSIS — Z34.80 ENCOUNTER FOR SUPERVISION OF OTHER NORMAL PREGNANCY, UNSPECIFIED TRIMESTER: ICD-10-CM

## 2019-03-21 PROCEDURE — 99213 OFFICE O/P EST LOW 20 MIN: CPT | Mod: GC

## 2019-03-21 PROCEDURE — 87653 STREP B DNA AMP PROBE: CPT

## 2019-03-21 PROCEDURE — G0463: CPT

## 2019-03-21 PROCEDURE — 87591 N.GONORRHOEAE DNA AMP PROB: CPT

## 2019-03-21 PROCEDURE — 87491 CHLMYD TRACH DNA AMP PROBE: CPT

## 2019-03-22 LAB
C TRACH RRNA SPEC QL NAA+PROBE: SIGNIFICANT CHANGE UP
N GONORRHOEA RRNA SPEC QL NAA+PROBE: SIGNIFICANT CHANGE UP
SPECIMEN SOURCE: SIGNIFICANT CHANGE UP

## 2019-03-23 LAB
GROUP B BETA STREP DNA (PCR): SIGNIFICANT CHANGE UP
GROUP B BETA STREP INTERPRETATION: SIGNIFICANT CHANGE UP
SOURCE GROUP B STREP: SIGNIFICANT CHANGE UP

## 2019-03-26 DIAGNOSIS — Z34.93 ENCOUNTER FOR SUPERVISION OF NORMAL PREGNANCY, UNSPECIFIED, THIRD TRIMESTER: ICD-10-CM

## 2019-03-28 ENCOUNTER — NON-APPOINTMENT (OUTPATIENT)
Age: 29
End: 2019-03-28

## 2019-03-28 ENCOUNTER — APPOINTMENT (OUTPATIENT)
Dept: OBGYN | Facility: CLINIC | Age: 29
End: 2019-03-28
Payer: MEDICAID

## 2019-03-28 ENCOUNTER — OUTPATIENT (OUTPATIENT)
Dept: OUTPATIENT SERVICES | Facility: HOSPITAL | Age: 29
LOS: 1 days | End: 2019-03-28
Payer: MEDICAID

## 2019-03-28 VITALS
DIASTOLIC BLOOD PRESSURE: 67 MMHG | HEIGHT: 59 IN | SYSTOLIC BLOOD PRESSURE: 100 MMHG | BODY MASS INDEX: 29.26 KG/M2 | WEIGHT: 145.13 LBS

## 2019-03-28 DIAGNOSIS — Z34.80 ENCOUNTER FOR SUPERVISION OF OTHER NORMAL PREGNANCY, UNSPECIFIED TRIMESTER: ICD-10-CM

## 2019-03-28 PROCEDURE — 99213 OFFICE O/P EST LOW 20 MIN: CPT | Mod: NC,TH

## 2019-03-28 PROCEDURE — G0463: CPT

## 2019-03-28 PROCEDURE — 81002 URINALYSIS NONAUTO W/O SCOPE: CPT

## 2019-03-28 PROCEDURE — 81002 URINALYSIS NONAUTO W/O SCOPE: CPT | Mod: NC

## 2019-04-01 ENCOUNTER — OUTPATIENT (OUTPATIENT)
Dept: OUTPATIENT SERVICES | Facility: HOSPITAL | Age: 29
LOS: 1 days | End: 2019-04-01
Payer: MEDICAID

## 2019-04-01 PROCEDURE — G9001: CPT

## 2019-04-02 DIAGNOSIS — Z34.93 ENCOUNTER FOR SUPERVISION OF NORMAL PREGNANCY, UNSPECIFIED, THIRD TRIMESTER: ICD-10-CM

## 2019-04-04 ENCOUNTER — NON-APPOINTMENT (OUTPATIENT)
Age: 29
End: 2019-04-04

## 2019-04-04 ENCOUNTER — APPOINTMENT (OUTPATIENT)
Dept: OBGYN | Facility: CLINIC | Age: 29
End: 2019-04-04
Payer: MEDICAID

## 2019-04-04 ENCOUNTER — OUTPATIENT (OUTPATIENT)
Dept: OUTPATIENT SERVICES | Facility: HOSPITAL | Age: 29
LOS: 1 days | End: 2019-04-04
Payer: MEDICAID

## 2019-04-04 VITALS
BODY MASS INDEX: 30.24 KG/M2 | DIASTOLIC BLOOD PRESSURE: 50 MMHG | HEIGHT: 59 IN | WEIGHT: 150 LBS | SYSTOLIC BLOOD PRESSURE: 98 MMHG

## 2019-04-04 DIAGNOSIS — Z34.80 ENCOUNTER FOR SUPERVISION OF OTHER NORMAL PREGNANCY, UNSPECIFIED TRIMESTER: ICD-10-CM

## 2019-04-04 PROCEDURE — G0463: CPT

## 2019-04-04 PROCEDURE — 81003 URINALYSIS AUTO W/O SCOPE: CPT | Mod: NC,QW

## 2019-04-04 PROCEDURE — 99213 OFFICE O/P EST LOW 20 MIN: CPT | Mod: NC,TH

## 2019-04-04 PROCEDURE — 81003 URINALYSIS AUTO W/O SCOPE: CPT

## 2019-04-08 DIAGNOSIS — Z34.90 ENCOUNTER FOR SUPERVISION OF NORMAL PREGNANCY, UNSPECIFIED, UNSPECIFIED TRIMESTER: ICD-10-CM

## 2019-04-10 ENCOUNTER — INPATIENT (INPATIENT)
Facility: HOSPITAL | Age: 29
LOS: 1 days | Discharge: HOME | End: 2019-04-12
Attending: OBSTETRICS & GYNECOLOGY | Admitting: OBSTETRICS & GYNECOLOGY
Payer: MEDICAID

## 2019-04-10 VITALS — HEIGHT: 60 IN | WEIGHT: 149.91 LBS

## 2019-04-10 DIAGNOSIS — O26.899 OTHER SPECIFIED PREGNANCY RELATED CONDITIONS, UNSPECIFIED TRIMESTER: ICD-10-CM

## 2019-04-10 DIAGNOSIS — Z34.80 ENCOUNTER FOR SUPERVISION OF OTHER NORMAL PREGNANCY, UNSPECIFIED TRIMESTER: ICD-10-CM

## 2019-04-10 DIAGNOSIS — Z3A.00 WEEKS OF GESTATION OF PREGNANCY NOT SPECIFIED: ICD-10-CM

## 2019-04-10 LAB
BLD GP AB SCN SERPL QL: NEGATIVE — SIGNIFICANT CHANGE UP
HCT VFR BLD CALC: 31.9 % — LOW (ref 34.5–45)
HGB BLD-MCNC: 11.1 G/DL — LOW (ref 11.5–15.5)
MCHC RBC-ENTMCNC: 24.9 PG — LOW (ref 27–34)
MCHC RBC-ENTMCNC: 34.9 GM/DL — SIGNIFICANT CHANGE UP (ref 32–36)
MCV RBC AUTO: 71.5 FL — LOW (ref 80–100)
PLATELET # BLD AUTO: 210 K/UL — SIGNIFICANT CHANGE UP (ref 150–400)
RBC # BLD: 4.47 M/UL — SIGNIFICANT CHANGE UP (ref 3.8–5.2)
RBC # FLD: 16.3 % — HIGH (ref 10.3–14.5)
RH IG SCN BLD-IMP: POSITIVE — SIGNIFICANT CHANGE UP
RH IG SCN BLD-IMP: POSITIVE — SIGNIFICANT CHANGE UP
T PALLIDUM AB TITR SER: NEGATIVE — SIGNIFICANT CHANGE UP
WBC # BLD: 8.8 K/UL — SIGNIFICANT CHANGE UP (ref 3.8–10.5)
WBC # FLD AUTO: 8.8 K/UL — SIGNIFICANT CHANGE UP (ref 3.8–10.5)

## 2019-04-10 PROCEDURE — 59409 OBSTETRICAL CARE: CPT | Mod: U7

## 2019-04-10 RX ORDER — AER TRAVELER 0.5 G/1
1 SOLUTION RECTAL; TOPICAL EVERY 4 HOURS
Qty: 0 | Refills: 0 | Status: DISCONTINUED | OUTPATIENT
Start: 2019-04-10 | End: 2019-04-10

## 2019-04-10 RX ORDER — AER TRAVELER 0.5 G/1
1 SOLUTION RECTAL; TOPICAL EVERY 4 HOURS
Qty: 0 | Refills: 0 | Status: DISCONTINUED | OUTPATIENT
Start: 2019-04-10 | End: 2019-04-12

## 2019-04-10 RX ORDER — SODIUM CHLORIDE 9 MG/ML
3 INJECTION INTRAMUSCULAR; INTRAVENOUS; SUBCUTANEOUS EVERY 8 HOURS
Qty: 0 | Refills: 0 | Status: DISCONTINUED | OUTPATIENT
Start: 2019-04-10 | End: 2019-04-12

## 2019-04-10 RX ORDER — OXYTOCIN 10 UNIT/ML
41.67 VIAL (ML) INJECTION
Qty: 20 | Refills: 0 | Status: DISCONTINUED | OUTPATIENT
Start: 2019-04-10 | End: 2019-04-12

## 2019-04-10 RX ORDER — OXYTOCIN 10 UNIT/ML
41.67 VIAL (ML) INJECTION
Qty: 20 | Refills: 0 | Status: DISCONTINUED | OUTPATIENT
Start: 2019-04-10 | End: 2019-04-10

## 2019-04-10 RX ORDER — OXYTOCIN 10 UNIT/ML
4 VIAL (ML) INJECTION
Qty: 30 | Refills: 0 | Status: DISCONTINUED | OUTPATIENT
Start: 2019-04-10 | End: 2019-04-10

## 2019-04-10 RX ORDER — SODIUM CHLORIDE 9 MG/ML
1000 INJECTION, SOLUTION INTRAVENOUS ONCE
Qty: 0 | Refills: 0 | Status: COMPLETED | OUTPATIENT
Start: 2019-04-10 | End: 2019-04-10

## 2019-04-10 RX ORDER — DIBUCAINE 1 %
1 OINTMENT (GRAM) RECTAL EVERY 4 HOURS
Qty: 0 | Refills: 0 | Status: DISCONTINUED | OUTPATIENT
Start: 2019-04-10 | End: 2019-04-10

## 2019-04-10 RX ORDER — SODIUM CHLORIDE 9 MG/ML
3 INJECTION INTRAMUSCULAR; INTRAVENOUS; SUBCUTANEOUS EVERY 8 HOURS
Qty: 0 | Refills: 0 | Status: DISCONTINUED | OUTPATIENT
Start: 2019-04-10 | End: 2019-04-10

## 2019-04-10 RX ORDER — IBUPROFEN 200 MG
600 TABLET ORAL EVERY 6 HOURS
Qty: 0 | Refills: 0 | Status: DISCONTINUED | OUTPATIENT
Start: 2019-04-10 | End: 2019-04-12

## 2019-04-10 RX ORDER — OXYCODONE HYDROCHLORIDE 5 MG/1
5 TABLET ORAL EVERY 4 HOURS
Qty: 0 | Refills: 0 | Status: DISCONTINUED | OUTPATIENT
Start: 2019-04-10 | End: 2019-04-12

## 2019-04-10 RX ORDER — HYDROCORTISONE 1 %
1 OINTMENT (GRAM) TOPICAL EVERY 4 HOURS
Qty: 0 | Refills: 0 | Status: DISCONTINUED | OUTPATIENT
Start: 2019-04-10 | End: 2019-04-12

## 2019-04-10 RX ORDER — ACETAMINOPHEN 500 MG
975 TABLET ORAL EVERY 6 HOURS
Qty: 0 | Refills: 0 | Status: DISCONTINUED | OUTPATIENT
Start: 2019-04-10 | End: 2019-04-12

## 2019-04-10 RX ORDER — SODIUM CHLORIDE 9 MG/ML
1000 INJECTION, SOLUTION INTRAVENOUS
Qty: 0 | Refills: 0 | Status: DISCONTINUED | OUTPATIENT
Start: 2019-04-10 | End: 2019-04-10

## 2019-04-10 RX ORDER — PRAMOXINE HYDROCHLORIDE 150 MG/15G
1 AEROSOL, FOAM RECTAL EVERY 4 HOURS
Qty: 0 | Refills: 0 | Status: DISCONTINUED | OUTPATIENT
Start: 2019-04-10 | End: 2019-04-10

## 2019-04-10 RX ORDER — CITRIC ACID/SODIUM CITRATE 300-500 MG
15 SOLUTION, ORAL ORAL EVERY 4 HOURS
Qty: 0 | Refills: 0 | Status: DISCONTINUED | OUTPATIENT
Start: 2019-04-10 | End: 2019-04-10

## 2019-04-10 RX ORDER — DIBUCAINE 1 %
1 OINTMENT (GRAM) RECTAL EVERY 4 HOURS
Qty: 0 | Refills: 0 | Status: DISCONTINUED | OUTPATIENT
Start: 2019-04-10 | End: 2019-04-12

## 2019-04-10 RX ORDER — HYDROCORTISONE 1 %
1 OINTMENT (GRAM) TOPICAL EVERY 4 HOURS
Qty: 0 | Refills: 0 | Status: DISCONTINUED | OUTPATIENT
Start: 2019-04-10 | End: 2019-04-10

## 2019-04-10 RX ORDER — KETOROLAC TROMETHAMINE 30 MG/ML
30 SYRINGE (ML) INJECTION ONCE
Qty: 0 | Refills: 0 | Status: DISCONTINUED | OUTPATIENT
Start: 2019-04-10 | End: 2019-04-12

## 2019-04-10 RX ORDER — ACETAMINOPHEN 500 MG
975 TABLET ORAL EVERY 6 HOURS
Qty: 0 | Refills: 0 | Status: COMPLETED | OUTPATIENT
Start: 2019-04-10 | End: 2020-03-08

## 2019-04-10 RX ORDER — SIMETHICONE 80 MG/1
80 TABLET, CHEWABLE ORAL EVERY 6 HOURS
Qty: 0 | Refills: 0 | Status: DISCONTINUED | OUTPATIENT
Start: 2019-04-10 | End: 2019-04-12

## 2019-04-10 RX ORDER — TETANUS TOXOID, REDUCED DIPHTHERIA TOXOID AND ACELLULAR PERTUSSIS VACCINE, ADSORBED 5; 2.5; 8; 8; 2.5 [IU]/.5ML; [IU]/.5ML; UG/.5ML; UG/.5ML; UG/.5ML
0.5 SUSPENSION INTRAMUSCULAR ONCE
Qty: 0 | Refills: 0 | Status: DISCONTINUED | OUTPATIENT
Start: 2019-04-10 | End: 2019-04-12

## 2019-04-10 RX ORDER — DIPHENHYDRAMINE HCL 50 MG
25 CAPSULE ORAL EVERY 6 HOURS
Qty: 0 | Refills: 0 | Status: DISCONTINUED | OUTPATIENT
Start: 2019-04-10 | End: 2019-04-12

## 2019-04-10 RX ORDER — OXYTOCIN 10 UNIT/ML
333.33 VIAL (ML) INJECTION
Qty: 20 | Refills: 0 | Status: COMPLETED | OUTPATIENT
Start: 2019-04-10

## 2019-04-10 RX ORDER — IBUPROFEN 200 MG
600 TABLET ORAL EVERY 6 HOURS
Qty: 0 | Refills: 0 | Status: COMPLETED | OUTPATIENT
Start: 2019-04-10 | End: 2020-03-08

## 2019-04-10 RX ORDER — GLYCERIN ADULT
1 SUPPOSITORY, RECTAL RECTAL AT BEDTIME
Qty: 0 | Refills: 0 | Status: DISCONTINUED | OUTPATIENT
Start: 2019-04-10 | End: 2019-04-12

## 2019-04-10 RX ORDER — MAGNESIUM HYDROXIDE 400 MG/1
30 TABLET, CHEWABLE ORAL
Qty: 0 | Refills: 0 | Status: DISCONTINUED | OUTPATIENT
Start: 2019-04-10 | End: 2019-04-12

## 2019-04-10 RX ORDER — LANOLIN
1 OINTMENT (GRAM) TOPICAL EVERY 6 HOURS
Qty: 0 | Refills: 0 | Status: DISCONTINUED | OUTPATIENT
Start: 2019-04-10 | End: 2019-04-12

## 2019-04-10 RX ORDER — OXYCODONE HYDROCHLORIDE 5 MG/1
5 TABLET ORAL
Qty: 0 | Refills: 0 | Status: DISCONTINUED | OUTPATIENT
Start: 2019-04-10 | End: 2019-04-12

## 2019-04-10 RX ORDER — DOCUSATE SODIUM 100 MG
100 CAPSULE ORAL
Qty: 0 | Refills: 0 | Status: DISCONTINUED | OUTPATIENT
Start: 2019-04-10 | End: 2019-04-12

## 2019-04-10 RX ORDER — PRAMOXINE HYDROCHLORIDE 150 MG/15G
1 AEROSOL, FOAM RECTAL EVERY 4 HOURS
Qty: 0 | Refills: 0 | Status: DISCONTINUED | OUTPATIENT
Start: 2019-04-10 | End: 2019-04-12

## 2019-04-10 RX ORDER — OXYTOCIN 10 UNIT/ML
333.33 VIAL (ML) INJECTION
Qty: 20 | Refills: 0 | Status: DISCONTINUED | OUTPATIENT
Start: 2019-04-10 | End: 2019-04-12

## 2019-04-10 RX ADMIN — SODIUM CHLORIDE 250 MILLILITER(S): 9 INJECTION, SOLUTION INTRAVENOUS at 04:41

## 2019-04-10 RX ADMIN — Medication 125 MILLIUNIT(S)/MIN: at 20:59

## 2019-04-10 RX ADMIN — Medication 4 MILLIUNIT(S)/MIN: at 10:00

## 2019-04-10 RX ADMIN — Medication 1000 MILLIUNIT(S)/MIN: at 20:30

## 2019-04-10 RX ADMIN — SODIUM CHLORIDE 2000 MILLILITER(S): 9 INJECTION, SOLUTION INTRAVENOUS at 04:00

## 2019-04-10 RX ADMIN — Medication 15 MILLILITER(S): at 09:32

## 2019-04-10 RX ADMIN — Medication 15 MILLILITER(S): at 16:49

## 2019-04-11 ENCOUNTER — APPOINTMENT (OUTPATIENT)
Dept: OBGYN | Facility: CLINIC | Age: 29
End: 2019-04-11

## 2019-04-11 LAB
HCT VFR BLD CALC: 34.4 % — LOW (ref 34.5–45)
HGB BLD-MCNC: 10.3 G/DL — LOW (ref 11.5–15.5)

## 2019-04-11 PROCEDURE — 93970 EXTREMITY STUDY: CPT | Mod: 26

## 2019-04-11 RX ADMIN — Medication 600 MILLIGRAM(S): at 12:47

## 2019-04-11 RX ADMIN — Medication 975 MILLIGRAM(S): at 06:15

## 2019-04-11 RX ADMIN — SODIUM CHLORIDE 3 MILLILITER(S): 9 INJECTION INTRAMUSCULAR; INTRAVENOUS; SUBCUTANEOUS at 06:53

## 2019-04-11 RX ADMIN — Medication 975 MILLIGRAM(S): at 05:30

## 2019-04-11 RX ADMIN — Medication 1 TABLET(S): at 12:49

## 2019-04-11 RX ADMIN — Medication 600 MILLIGRAM(S): at 13:17

## 2019-04-11 RX ADMIN — Medication 600 MILLIGRAM(S): at 18:41

## 2019-04-11 RX ADMIN — Medication 600 MILLIGRAM(S): at 18:20

## 2019-04-11 NOTE — PROGRESS NOTE ADULT - SUBJECTIVE AND OBJECTIVE BOX
OB Progress Note:  PPD#1    S: 29yo  PPD#1 s/p . Patient feels well. Pain is well controlled. She is tolerating a regular diet and passing flatus. She is voiding spontaneously, and ambulating without difficulty. Denies CP/SOB. Denies lightheadedness/dizziness. Denies N/V. Complaining of L calf pain overnight.    O:  Vitals:  Vital Signs Last 24 Hrs  T(C): 36.7 (2019 05:40), Max: 37.1 (10 Apr 2019 21:04)  T(F): 98.1 (2019 05:40), Max: 98.8 (10 Apr 2019 21:04)  HR: 83 (2019 05:40) (68 - 97)  BP: 108/71 (2019 05:40) (100/65 - 128/60)  BP(mean): --  RR: 18 (2019 05:40) (15 - 18)  SpO2: 97% (10 Apr 2019 22:30) (96% - 99%)    MEDICATIONS  (STANDING):  acetaminophen   Tablet .. 975 milliGRAM(s) Oral every 6 hours  diphtheria/tetanus/pertussis (acellular) Vaccine (ADAcel) 0.5 milliLiter(s) IntraMuscular once  ibuprofen  Tablet. 600 milliGRAM(s) Oral every 6 hours  ketorolac   Injectable 30 milliGRAM(s) IV Push once  oxyCODONE    IR 5 milliGRAM(s) Oral every 3 hours  oxytocin Infusion 333.333 milliUNIT(s)/Min (1000 mL/Hr) IV Continuous <Continuous>  oxytocin Infusion 41.667 milliUNIT(s)/Min (125 mL/Hr) IV Continuous <Continuous>  prenatal multivitamin 1 Tablet(s) Oral daily  sodium chloride 0.9% lock flush 3 milliLiter(s) IV Push every 8 hours      Physical Exam:  General: NAD  Abdomen: soft, non-tender, non-distended, fundus firm  Vaginal: Lochia wnl  Extremities: L calf TTP, no erythema, no edema    Labs:  Blood type: A Positive  Rubella IgG: Positive (10-24 @ 14:11)  RPR: Negative                          10.3<L>   -- >-----------< --    (  @ 06:36 )             34.4<L>                        11.1<L>   8.8 >-----------< 210    ( 10 @ 05:04 )             31.9<L>

## 2019-04-11 NOTE — PROGRESS NOTE ADULT - PROBLEM SELECTOR PLAN 1
- Pain well controlled, continue current pain regimen  - Increase ambulation  - Continue regular diet  -LE duplex to r/o dvt    Abby Aleman PGY-1

## 2019-04-12 ENCOUNTER — TRANSCRIPTION ENCOUNTER (OUTPATIENT)
Age: 29
End: 2019-04-12

## 2019-04-12 VITALS
DIASTOLIC BLOOD PRESSURE: 63 MMHG | TEMPERATURE: 98 F | SYSTOLIC BLOOD PRESSURE: 103 MMHG | HEART RATE: 72 BPM | RESPIRATION RATE: 18 BRPM

## 2019-04-12 PROCEDURE — 93970 EXTREMITY STUDY: CPT

## 2019-04-12 PROCEDURE — 85018 HEMOGLOBIN: CPT

## 2019-04-12 PROCEDURE — 86850 RBC ANTIBODY SCREEN: CPT

## 2019-04-12 PROCEDURE — G0463: CPT

## 2019-04-12 PROCEDURE — 59050 FETAL MONITOR W/REPORT: CPT

## 2019-04-12 PROCEDURE — 86780 TREPONEMA PALLIDUM: CPT

## 2019-04-12 PROCEDURE — 86901 BLOOD TYPING SEROLOGIC RH(D): CPT

## 2019-04-12 PROCEDURE — 59025 FETAL NON-STRESS TEST: CPT

## 2019-04-12 PROCEDURE — 86900 BLOOD TYPING SEROLOGIC ABO: CPT

## 2019-04-12 PROCEDURE — 85014 HEMATOCRIT: CPT

## 2019-04-12 PROCEDURE — 85027 COMPLETE CBC AUTOMATED: CPT

## 2019-04-12 RX ORDER — NORETHINDRONE 0.35 MG/1
1 TABLET ORAL
Qty: 28 | Refills: 2
Start: 2019-04-12 | End: 2019-07-04

## 2019-04-12 RX ORDER — ACETAMINOPHEN 500 MG
3 TABLET ORAL
Qty: 0 | Refills: 0 | DISCHARGE
Start: 2019-04-12

## 2019-04-12 RX ORDER — IBUPROFEN 200 MG
1 TABLET ORAL
Qty: 0 | Refills: 0 | DISCHARGE
Start: 2019-04-12

## 2019-04-12 RX ADMIN — Medication 1 TABLET(S): at 13:23

## 2019-04-12 RX ADMIN — Medication 600 MILLIGRAM(S): at 14:00

## 2019-04-12 RX ADMIN — Medication 600 MILLIGRAM(S): at 06:30

## 2019-04-12 RX ADMIN — Medication 975 MILLIGRAM(S): at 14:00

## 2019-04-12 RX ADMIN — Medication 600 MILLIGRAM(S): at 05:59

## 2019-04-12 RX ADMIN — Medication 600 MILLIGRAM(S): at 13:23

## 2019-04-12 RX ADMIN — Medication 975 MILLIGRAM(S): at 13:23

## 2019-04-12 NOTE — PROGRESS NOTE ADULT - PROBLEM SELECTOR PLAN 1
- Pain well controlled, continue current pain regimen  - Increase ambulation  - Continue regular diet  - Discharge planning     Abby Aleman PGY-1

## 2019-04-12 NOTE — DISCHARGE NOTE OB - PATIENT PORTAL LINK FT
You can access the [facility] Patient Portal, offered by [facility], by registering with the following website: [email or web address]

## 2019-04-12 NOTE — DISCHARGE NOTE OB - PROVIDER TOKENS
FREE:[LAST:[[facility]],PHONE:[phone]],FAX:[(   [phone]    -],ADDRESS:[[address] [place] [address]]]

## 2019-04-12 NOTE — DISCHARGE NOTE OB - CARE PROVIDER_API CALL
[facility],   [address]  [address]  Phone: [phone]  Fax: (   )    -  Follow Up Time:

## 2019-04-12 NOTE — PROGRESS NOTE ADULT - SUBJECTIVE AND OBJECTIVE BOX
OB Progress Note:  PPD#2    S: 27yo PPD#2 s/p . Patient feels well. Pain is well controlled. She is tolerating a regular diet and passing flatus. She is voiding spontaneously, and ambulating without difficulty. Denies CP/SOB. Denies lightheadedness/dizziness. Denies N/V.    O:  Vitals:   Vital Signs Last 24 Hrs  T(C): 36.6 (2019 05:27), Max: 36.6 (2019 17:38)  T(F): 97.9 (2019 05:27), Max: 97.9 (2019 17:38)  HR: 72 (2019 05:27) (72 - 77)  BP: 103/63 (2019 05:27) (103/63 - 113/74)  BP(mean): --  RR: 18 (2019 05:27) (18 - 18)  SpO2: 98% (2019 17:38) (98% - 98%)    MEDICATIONS  (STANDING):  acetaminophen   Tablet .. 975 milliGRAM(s) Oral every 6 hours  diphtheria/tetanus/pertussis (acellular) Vaccine (ADAcel) 0.5 milliLiter(s) IntraMuscular once  ibuprofen  Tablet. 600 milliGRAM(s) Oral every 6 hours  ketorolac   Injectable 30 milliGRAM(s) IV Push once  oxyCODONE    IR 5 milliGRAM(s) Oral every 3 hours  oxytocin Infusion 333.333 milliUNIT(s)/Min (1000 mL/Hr) IV Continuous <Continuous>  oxytocin Infusion 41.667 milliUNIT(s)/Min (125 mL/Hr) IV Continuous <Continuous>  prenatal multivitamin 1 Tablet(s) Oral daily  sodium chloride 0.9% lock flush 3 milliLiter(s) IV Push every 8 hours    MEDICATIONS  (PRN):  dibucaine 1% Ointment 1 Application(s) Topical every 4 hours PRN Perineal Discomfort  diphenhydrAMINE 25 milliGRAM(s) Oral every 6 hours PRN Itching  docusate sodium 100 milliGRAM(s) Oral two times a day PRN Stool Softening  glycerin Suppository - Adult 1 Suppository(s) Rectal at bedtime PRN Constipation  hydrocortisone 1% Cream 1 Application(s) Topical every 4 hours PRN Moderate to Severe Perineal Pain  lanolin Ointment 1 Application(s) Topical every 6 hours PRN Sore Nipples  magnesium hydroxide Suspension 30 milliLiter(s) Oral two times a day PRN Constipation  oxyCODONE    IR 5 milliGRAM(s) Oral every 4 hours PRN Severe Pain (7 -10)  pramoxine 1%/zinc 5% Cream 1 Application(s) Topical every 4 hours PRN Moderate to Severe Perineal Pain  simethicone 80 milliGRAM(s) Chew every 6 hours PRN Gas  witch hazel Pads 1 Application(s) Topical every 4 hours PRN Perineal Discomfort      Physical Exam:  General: NAD  Abdomen: soft, non-tender, non-distended, fundus firm  Vaginal: Lochia wnl  Extremities: NTTP, no erythema, no edema    Labs:  Blood type: A Positive  Rubella IgG: Positive (10-24 @ 14:11)  RPR: Negative                          10.3<L>   -- >-----------< --    (  @ 06:36 )             34.4<L>                        11.1<L>   8.8 >-----------< 210    ( 04-10 @ 05:04 )             31.9<L>

## 2019-04-12 NOTE — DISCHARGE NOTE OB - MEDICATION SUMMARY - MEDICATIONS TO TAKE
I will START or STAY ON the medications listed below when I get home from the hospital:    acetaminophen 325 mg oral tablet  -- 3 tab(s) by mouth every 6 hours  -- Indication: For mild pain    ibuprofen 600 mg oral tablet  -- 1 tab(s) by mouth every 6 hours  -- Indication: For moderate pain    Prenatal Multivitamins with Folic Acid 1 mg oral tablet  -- 1 tab(s) by mouth once a day  -- Indication: For breast feeding    norethindrone 0.35 mg oral tablet  -- 1 tab(s) by mouth once a day MDD:1 tab  -- Do not take this drug if you are pregnant.  It is very important that you take or use this exactly as directed.  Do not skip doses or discontinue unless directed by your doctor.    -- Indication: For contraception

## 2019-04-16 DIAGNOSIS — Z71.89 OTHER SPECIFIED COUNSELING: ICD-10-CM

## 2019-06-19 ENCOUNTER — MED ADMIN CHARGE (OUTPATIENT)
Age: 29
End: 2019-06-19

## 2019-11-19 NOTE — ED PROVIDER NOTE - PHYSICAL EXAMINATION
RM:________     PCP: Marc Ludwig MD    : 1952  AGE: 67 y.o.    REASON FOR VISIT/  CC: PERMANENT AFIB        WT: ____________ BP: __________L __________R HR______    CHEST PAIN: _____________    SOA: _____________PALPS: _______________     LIGHTHEADED: ___________FATIGUE: ________________ EDEMA __________    ALLERGIES:Cephalexin; Codeine; and Penicillins SMOKING HISTORY:  Social History     Tobacco Use   • Smoking status: Current Every Day Smoker     Packs/day: 0.25     Years: 45.00     Pack years: 11.25     Types: Cigarettes   • Smokeless tobacco: Never Used   • Tobacco comment: smoked this morning   Substance Use Topics   • Alcohol use: Yes     Alcohol/week: 4.8 - 5.4 oz     Types: 1 - 2 Shots of liquor, 7 Standard drinks or equivalent per week     Comment: 2 rum a day   • Drug use: No     CAFFEINE USE_________________  ALCOHOL ______________________    Below is the patient's most recent value for Albumin, ALT, AST, BUN, Calcium, Chloride, Cholesterol, CO2, Creatinine, GFR, Glucose, HDL, Hematocrit, Hemoglobin, Hemoglobin A1C, LDL, Magnesium, Phosphorus, Platelets, Potassium, PSA, Sodium, Triglycerides, TSH and WBC.   Lab Results   Component Value Date    ALBUMIN 3.90 2019    ALT 12 2019    AST 15 2019    BUN 8 2019    CALCIUM 9.4 2019     2019    CO2 27.4 2019    CREATININE 0.89 2019    GLU 88 2018    HDL 70 2018    HCT 38.1 2019    HGB 13.0 2019    HGBA1C 5.3 2016    LDL 55 2018    MG 2.0 2019    PHOS 3.2 2019     2019    K 3.7 2019    PSA 0.089 2019     2019    TRIG 61 2018    TSH 1.250 2019    WBC 6.34 2019          NEW DIAGNOSIS/ SURGERY/ HOSP OR ED VISITS: ______________________    __________________________________________________________________      RECENT LABS OR DIAGNOSTIC TESTING:   _____________________________    __________________________________________________________________      ASSESSMENT/ PLAN: _______________________________________________    __________________________________________________________________     see attending note

## 2019-12-06 NOTE — ED ADULT NURSE NOTE - CAS ELECT INFOMATION PROVIDED
12/06/19 1051   Events/Summary/Plan   Events/Summary/Plan Room air SpO2 check   Chest Exam   Respiration 16   Pulse 88   Oximetry   #Pulse Oximetry (Single Determination) Yes   Oxygen   Pulse Oximetry 95 %   O2 (LPM) 2   O2 Daily Delivery Respiratory  Silicone Nasal Cannula   Room Air Challenge Fail  (Room air SpO2 = 85%)      DC instructions

## 2020-03-03 ENCOUNTER — EMERGENCY (EMERGENCY)
Facility: HOSPITAL | Age: 30
LOS: 1 days | Discharge: HOME | End: 2020-03-03
Attending: EMERGENCY MEDICINE | Admitting: EMERGENCY MEDICINE
Payer: MEDICAID

## 2020-03-03 VITALS
TEMPERATURE: 97 F | HEART RATE: 81 BPM | RESPIRATION RATE: 19 BRPM | OXYGEN SATURATION: 97 % | HEIGHT: 62 IN | WEIGHT: 122.36 LBS | SYSTOLIC BLOOD PRESSURE: 123 MMHG | DIASTOLIC BLOOD PRESSURE: 79 MMHG

## 2020-03-03 PROCEDURE — 12001 RPR S/N/AX/GEN/TRNK 2.5CM/<: CPT

## 2020-03-03 PROCEDURE — 99283 EMERGENCY DEPT VISIT LOW MDM: CPT | Mod: 25

## 2020-03-03 PROCEDURE — 99282 EMERGENCY DEPT VISIT SF MDM: CPT | Mod: 25

## 2020-03-03 NOTE — ED PROVIDER NOTE - OBJECTIVE STATEMENT
30 yo female, no pmh comes to the ED co left 3rd digit finger laceration .  Occurred this evening while cutting tomatoes.    Denies sensory/ motor deficits. UTD with a tetanus shot.

## 2020-03-03 NOTE — ED PROVIDER NOTE - NSFOLLOWUPINSTRUCTIONS_ED_ALL_ED_FT
Follow up with your primary physician for a post hospital visit within 48 hours taking all results from the ER to be reviewed.  Keep the wound dry the first 24 hours, then you can clean with soap/water, pat dry, and apply neosporin twice a day.  Keep the wound covered until fully healed. If any signs of infection such as redness, discharge, fevers, chills, or any worsening, concerning or new signs or symptoms return to the ER prior to removal of the sutures.  Return to the ER in 1 week for suture removal.       Laceration Care, Adult  A laceration is a cut that may go through all layers of the skin. The cut may also go into the tissue that is right under the skin. Some cuts heal on their own. Others need to be closed with stitches (sutures), staples, skin adhesive strips, or skin glue. Taking care of your injury lowers your risk of infection, helps your injury to heal better, and may prevent scarring.  Supplies needed:  Soap.Water.Hand .Bandage (dressing).Antibiotic ointment.Clean towel.How to take care of your cut  Wash your hands with soap and water before touching your wound or changing your bandage. If soap and water are not available, use hand .  If your doctor used stitches or staples:     Keep the wound clean and dry.If you were given a bandage, change it at least once a day as told by your doctor. You should also change it if it gets wet or dirty.Keep the wound completely dry for the first 24 hours, or as told by your doctor. After that, you may take a shower or a bath. Do not get the wound soaked in water until after the stitches or staples have been removed.Clean the wound once a day, or as told by your doctor:  Wash the wound with soap and water.Rinse the wound with water to remove all soap.Pat the wound dry with a clean towel. Do not rub the wound.After you clean the wound, put a thin layer of antibiotic ointment on it as told by your doctor. This ointment:  Helps to prevent infection.Keeps the bandage from sticking to the wound.Have your stitches or staples removed as told by your doctor.If your doctor used skin adhesive strips:     Keep the wound clean and dry.If you were given a bandage, you should change it at least once a day as told by your doctor. You should also change it if it gets wet or dirty.Do not get the skin adhesive strips wet. You can take a shower or a bath, but keep the wound dry.If the wound gets wet, pat it dry with a clean towel. Do not rub the wound.Skin adhesive strips fall off on their own. You can trim the strips as the wound heals. Do not remove any strips that are still stuck to the wound. They will fall off after a while.If your doctor used skin glue:     Try to keep your wound dry, but you may briefly wet it in the shower or bath. Do not soak the wound in water, such as by swimming.After you take a shower or a bath, gently pat the wound dry with a clean towel. Do not rub the wound.Do not do any activities that will make you really sweaty until the skin glue has fallen off on its own.Do not apply liquid, cream, or ointment medicine to your wound while the skin glue is still on.If you were given a bandage, you should change it at least once a day or as told by your doctor. You should also change it if it gets dirty or wet.If a bandage is placed over the wound, do not let the tape touch the skin glue.Do not pick at the glue. The skin glue usually stays on for 5–10 days. Then, it falls off the skin.General instructions        Take over-the-counter and prescription medicines only as told by your doctor.If you were given antibiotic medicine or ointment, take or apply it as told by your doctor. Do not stop using it even if your condition improves.Do not scratch or pick at the wound.Check your wound every day for signs of infection. Watch for:  Redness, swelling, or pain.Fluid, blood, or pus.Raise (elevate) the injured area above the level of your heart while you are sitting or lying down.If directed, put ice on the affected area:  Put ice in a plastic bag.Place a towel between your skin and the bag.Leave the ice on for 20 minutes, 2–3 times a day.Prevent scarring by covering your wound with sunscreen of at least 30 SPF whenever you are outside after your wound has healed.Keep all follow-up visits as told by your doctor. This is important.Get help if:  You got a tetanus shot and you have any of these problems at the injection site:  Swelling.Very bad pain.Redness.Bleeding.You have a fever.A wound that was closed breaks open.You notice a bad smell coming from your wound or your bandage.You notice something coming out of the wound, such as wood or glass.Medicine does not relieve your pain.You have more redness, swelling, or pain at the site of your wound.You have fluid, blood, or pus coming from your wound.You notice a change in the color of your skin near your wound.You need to change the bandage often because fluid, blood, or pus is coming from the wound.You start to have a new rash.You start to have numbness around the wound.Get help right away if:  You have very bad swelling around the wound.Your pain suddenly gets worse and is very bad.You notice painful lumps near the wound or anywhere on your body.You have a red streak going away from your wound.The wound is on your hand or foot, and:  You cannot move a finger or toe.Your fingers or toes look pale or bluish.Summary  A laceration is a cut that may go through all layers of the skin. The cut may also go into the tissue right under the skin.Some cuts heal on their own. Others need to be closed with stitches, staples, skin adhesive strips, or skin glue.Follow your doctor's instructions for caring for your cut. Proper care of a cut lowers the risk of infection, helps the cut heal better, and prevents scarring. Follow up with your primary physician for a post hospital visit within 48 hours taking all results from the ER to be reviewed.  Keep the wound dry the first 24 hours, then you can clean with soap/water, pat dry, and apply neosporin twice a day.  Keep the wound covered until fully healed. If any signs of infection such as redness, discharge, fevers, chills, or any worsening, concerning or new signs or symptoms return to the ER prior to removal of the sutures.  Return to the ER in 1 week for suture removal.    If needed for pain control ou can take Tylenol 650 mg 1 tab every 4-6 hours.      Laceration Care, Adult  A laceration is a cut that may go through all layers of the skin. The cut may also go into the tissue that is right under the skin. Some cuts heal on their own. Others need to be closed with stitches (sutures), staples, skin adhesive strips, or skin glue. Taking care of your injury lowers your risk of infection, helps your injury to heal better, and may prevent scarring.  Supplies needed:  Soap.Water.Hand .Bandage (dressing).Antibiotic ointment.Clean towel.How to take care of your cut  Wash your hands with soap and water before touching your wound or changing your bandage. If soap and water are not available, use hand .  If your doctor used stitches or staples:     Keep the wound clean and dry.If you were given a bandage, change it at least once a day as told by your doctor. You should also change it if it gets wet or dirty.Keep the wound completely dry for the first 24 hours, or as told by your doctor. After that, you may take a shower or a bath. Do not get the wound soaked in water until after the stitches or staples have been removed.Clean the wound once a day, or as told by your doctor:  Wash the wound with soap and water.Rinse the wound with water to remove all soap.Pat the wound dry with a clean towel. Do not rub the wound.After you clean the wound, put a thin layer of antibiotic ointment on it as told by your doctor. This ointment:  Helps to prevent infection.Keeps the bandage from sticking to the wound.Have your stitches or staples removed as told by your doctor.If your doctor used skin adhesive strips:     Keep the wound clean and dry.If you were given a bandage, you should change it at least once a day as told by your doctor. You should also change it if it gets wet or dirty.Do not get the skin adhesive strips wet. You can take a shower or a bath, but keep the wound dry.If the wound gets wet, pat it dry with a clean towel. Do not rub the wound.Skin adhesive strips fall off on their own. You can trim the strips as the wound heals. Do not remove any strips that are still stuck to the wound. They will fall off after a while.If your doctor used skin glue:     Try to keep your wound dry, but you may briefly wet it in the shower or bath. Do not soak the wound in water, such as by swimming.After you take a shower or a bath, gently pat the wound dry with a clean towel. Do not rub the wound.Do not do any activities that will make you really sweaty until the skin glue has fallen off on its own.Do not apply liquid, cream, or ointment medicine to your wound while the skin glue is still on.If you were given a bandage, you should change it at least once a day or as told by your doctor. You should also change it if it gets dirty or wet.If a bandage is placed over the wound, do not let the tape touch the skin glue.Do not pick at the glue. The skin glue usually stays on for 5–10 days. Then, it falls off the skin.General instructions        Take over-the-counter and prescription medicines only as told by your doctor.If you were given antibiotic medicine or ointment, take or apply it as told by your doctor. Do not stop using it even if your condition improves.Do not scratch or pick at the wound.Check your wound every day for signs of infection. Watch for:  Redness, swelling, or pain.Fluid, blood, or pus.Raise (elevate) the injured area above the level of your heart while you are sitting or lying down.If directed, put ice on the affected area:  Put ice in a plastic bag.Place a towel between your skin and the bag.Leave the ice on for 20 minutes, 2–3 times a day.Prevent scarring by covering your wound with sunscreen of at least 30 SPF whenever you are outside after your wound has healed.Keep all follow-up visits as told by your doctor. This is important.Get help if:  You got a tetanus shot and you have any of these problems at the injection site:  Swelling.Very bad pain.Redness.Bleeding.You have a fever.A wound that was closed breaks open.You notice a bad smell coming from your wound or your bandage.You notice something coming out of the wound, such as wood or glass.Medicine does not relieve your pain.You have more redness, swelling, or pain at the site of your wound.You have fluid, blood, or pus coming from your wound.You notice a change in the color of your skin near your wound.You need to change the bandage often because fluid, blood, or pus is coming from the wound.You start to have a new rash.You start to have numbness around the wound.Get help right away if:  You have very bad swelling around the wound.Your pain suddenly gets worse and is very bad.You notice painful lumps near the wound or anywhere on your body.You have a red streak going away from your wound.The wound is on your hand or foot, and:  You cannot move a finger or toe.Your fingers or toes look pale or bluish.Summary  A laceration is a cut that may go through all layers of the skin. The cut may also go into the tissue right under the skin.Some cuts heal on their own. Others need to be closed with stitches, staples, skin adhesive strips, or skin glue.Follow your doctor's instructions for caring for your cut. Proper care of a cut lowers the risk of infection, helps the cut heal better, and prevents scarring.

## 2020-03-03 NOTE — ED PROVIDER NOTE - PATIENT PORTAL LINK FT
You can access the FollowMyHealth Patient Portal offered by [facility] by registering at the following website: [email or web address]. By joining [employer]’s FollowMyHealth portal, you will also be able to view your health information using other applications (apps) compatible with our system.

## 2020-03-03 NOTE — ED ADULT NURSE NOTE - OBJECTIVE STATEMENT
Patient presents to the ED with laceration to the left 3rd digit. Patient sustained injury approximately 20 min PTA while using knife to prepare food. Patient states that her pain is currently a 9/10. Patient has wrapped the area and applied pressure but blood is visible. Patient presents to the ED with laceration to the left 3rd digit. Patient sustained injury approximately 20 min PTA while using knife to prepare food. Patient states that her pain is currently a 9/10. Patient has wrapped the area and applied pressure but blood is visible. Patient does not take any anticoagulant medications. Patient did not take anything for pain. Patient denies any other injuries or medical problems at this time.

## 2020-03-03 NOTE — ED PROVIDER NOTE - ATTENDING CONTRIBUTION TO CARE
I have personally seen and examined this patient. I have fully participated in the care of this patient. I have reviewed all pertinent clinical information, including history physical exam, plan and the PA's note and agree except as noted  30 yo female, no pmh comes to the ED co left 3rd digit finger laceration .  Occurred this evening while cutting tomatoes.    Denies sensory/ motor deficits. UTD with a tetanus shot.  PE :  small linear alc on distal phalanx of left 3 rd finger

## 2020-03-03 NOTE — ED PROVIDER NOTE - CPE EDP SKIN NORM
1930: Bedside and Verbal shift change report given to MARIAM Faulkner RN (oncoming nurse) by JAYE Kenney (offgoing nurse). Report included the following information SBAR, Kardex, ED Summary, Procedure Summary, Intake/Output, MAR, Accordion, Recent Results, Cardiac Rhythm NSR and Alarm Parameters . 2000: Assumed care of the patient. Initial assessment performed. Patient intubated and sedated on 10mcg Propofol. Vent settings AC with rate 14, , PEEP 5, FiO2 40%. Levophed gtt at [address]. Patient withdraws in all extremities, does not follow commands. Pupils equal and reactive 2/2. Afebrile with rectal probe to monitor temps. 2027: Propofol gtt turned off. [id number]: When laying patient down to reposition, TV's noted to drop significantly, down to 90's. Attempted to suction patient and could not pass suction catheter all the way down ETT. Attempted to lavage, extremely difficult to bag patient. Multiple attempts made to clear ETT without success. Respiratory called and came to bedside. TV still low. Oxygen saturations started to drop, both RT and Nursing determined patient was not being ventilated through ETT. ETT then removed by RT, and large, hard mucous plug seen completely occluding end of ETT.     2100: After extubation, patient slightly tachypnic with RR in upper 30's. Pt does not appear to be in any further distress. Patient placed on 6L nasal cannula with O2 sats in upper 90's. Will obtain ABG in 30 minutes. 2115: Updated Dr. Cordelia Mcmahon with recent events and ABG results. Obtained orders for post-ext. CXR and BiPAP 13/5 with humidification. 2130: Patient now on BiPAP and is tolerating well with good O2 sats. Appears much more comfortable with RR in 20's. 0000: Reassessment completed. No acute changes. Will continue to monitor. 0400: Reassessment. No changes to previous assessment. Will continue to monitor. 0730: Bedside and Verbal shift change report given to JAYE Bartlett RN and DOROTHY Spears, RN (oncoming nurse) by Sinai Mendiola. Lexi Deluca, RN (offgoing nurse). Report included the following information SBAR, Kardex, ED Summary, Intake/Output, MAR, Accordion, Recent Results, Cardiac Rhythm AV Paced and Alarm Parameters . SHIFT SUMMARY:   Pt now on BiPAP 13/5 with humidity, tolerating well. Now off sedation, will respond to stimulus, but still no command following. Levophed titrated up overnight to maintain MAP>65. WOUNDS
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
## 2021-08-31 NOTE — ED ADULT NURSE NOTE - MODE OF DISCHARGE
I have personally evaluated and examined the patient. The Attending was available to me as a supervising provider if needed. Ambulatory

## 2021-10-20 NOTE — ED ADULT NURSE NOTE - CADM POA PRESS ULCER
No General Sunscreen Counseling: I recommended a broad spectrum sunscreen with a SPF of 30 or higher.  I explained that SPF 30 sunscreens block approximately 97 percent of the sun's harmful rays.  Sunscreens should be applied at least 15 minutes prior to expected sun exposure and then every 2 hours after that as long as sun exposure continues. If swimming or exercising sunscreen should be reapplied every 45 minutes to an hour after getting wet or sweating.  One ounce, or the equivalent of a shot glass full of sunscreen, is adequate to protect the skin not covered by a bathing suit. I also recommended a lip balm with a sunscreen as well. Sun protective clothing can be used in lieu of sunscreen but must be worn the entire time you are exposed to the sun's rays. Detail Level: Detailed

## 2021-11-08 ENCOUNTER — INPATIENT (INPATIENT)
Facility: HOSPITAL | Age: 31
LOS: 2 days | Discharge: HOME | DRG: 419 | End: 2021-11-11
Attending: HOSPITALIST | Admitting: INTERNAL MEDICINE
Payer: MEDICAID

## 2021-11-08 VITALS
OXYGEN SATURATION: 100 % | SYSTOLIC BLOOD PRESSURE: 141 MMHG | TEMPERATURE: 99 F | WEIGHT: 121.92 LBS | DIASTOLIC BLOOD PRESSURE: 89 MMHG | HEART RATE: 76 BPM | RESPIRATION RATE: 16 BRPM | HEIGHT: 62 IN

## 2021-11-08 PROCEDURE — 71045 X-RAY EXAM CHEST 1 VIEW: CPT | Mod: 26

## 2021-11-08 PROCEDURE — 99285 EMERGENCY DEPT VISIT HI MDM: CPT

## 2021-11-08 RX ORDER — KETOROLAC TROMETHAMINE 30 MG/ML
30 SYRINGE (ML) INJECTION ONCE
Refills: 0 | Status: DISCONTINUED | OUTPATIENT
Start: 2021-11-08 | End: 2021-11-08

## 2021-11-08 RX ADMIN — Medication 30 MILLIGRAM(S): at 23:59

## 2021-11-08 NOTE — ED ADULT NURSE NOTE - OBJECTIVE STATEMENT
Pt denies any hx of cardiac or any other diseases.  Pt c/o chest pain started 45 min PTA, no exertion.

## 2021-11-08 NOTE — ED ADULT TRIAGE NOTE - TEMPERATURE IN FAHRENHEIT (DEGREES F)
[facility]        Pt Name: Haley Knox  MRN: [id number]  [place] [facility] [address], [place], [address]   Phone [phone]   COMPREHENSIVE METABOLIC PANEL W/ REFLEX TO MG FOR LOW K - Abnormal; Notable for the following components:    Glucose 121 (*)     Total Protein 9.1 (*)     Albumin/Globulin Ratio 0.9 (*)     Alkaline Phosphatase 160 (*)     ALT 90 (*)     AST 70 (*)     All other components within normal limits    Narrative:     Performed at:  [facility] [address], [place], [address]   Phone [phone]   TROPONIN    Narrative:     Performed at:  [facility] [address], [place], [facility] [address]   Phone 21     Narrative:     Performed at:  [facility] [address], [place], [address]   Phone [phone] D-DIMER, QUANTITATIVE    Narrative:     Performed at:  [facility]  [address],  [place], [address]   Phone [phone]       [facility] Emergency Department  [address] [facility]  [phone]  GORDO Krishnamurthy (electronically signed)          GORDO Barraza  04/25/21 4105

## 2021-11-08 NOTE — ED ADULT TRIAGE NOTE - PAIN: PRESENCE, MLM
Chief Complaint  PPD placed      Active Problems    1  Abnormal ECG (794 31) (R94 31)   2  Adult BMI 35 0-35 9 kg/sq m (V85 35) (Z68 35)   3  Allergic rhinitis (477 9) (J30 9)   4  Anxiety disorder (300 00) (F41 9)   5  Backache (724 5) (M54 9)   6  Dysmenorrhea (625 3) (N94 6)   7  Encounter for PPD test (V74 1) (Z11 1)   8  Encounter for routine child health examination w/o abnormal findings (V20 2) (Z00 129)   9  Encounter for routine pelvic examination (V72 31) (Z01 419)   10  Hepatitis B vaccination status unknown (V49 89) (Z78 9)   11  Herpes simplex infection (054 9) (B00 9)   12  Irritable bowel syndrome (564 1) (K58 9)   13  Long term use of drug (V58 69) (Z79 899)   14  Measles, mumps, rubella (MMR) vaccination status unknown (V49 89) (Z78 9)   15  Near syncope (780 2) (R55)   16  Need for diphtheria-tetanus-pertussis (Tdap) vaccine, adult/adolescent (V06 1) (Z23)   17  Other specified anxiety disorders (300 09) (F41 8)   18  Palpitations (785 1) (R00 2)   19  Paroxysmal SVT (supraventricular tachycardia) (427 0) (I47 1)   20  Pyelonephritis (590 80) (N12)   21  Shortness of breath dyspnea (786 05) (R06 02)    Current Meds   1  Aygestin 5 MG Oral Tablet; Take 1 tablet daily Recorded    Allergies    1   No Known Drug Allergies    Plan  Need for diphtheria-tetanus-pertussis (Tdap) vaccine, adult/adolescent    · Tubersol 5 UNIT/0 1ML Intradermal Solution    Future Appointments    Date/Time Provider Specialty Site   06/22/2016 11:45 AM [facility] DEPT  OF [place]     Signatures   Electronically signed by : Mayi Herndon DO; Jun 20 2016 10:53AM EST                       (Review) chest/complains of pain/discomfort

## 2021-11-08 NOTE — ED ADULT NURSE NOTE - NS ED NOTE ABUSE RESPONSE YN
Dear Dr. Dana Lyn MD ,  
I wanted to thank you for your referral of your patient Carlos Enrique Miller . We have picked this patient up Physical Therapy with the following frequency: 2 wk 1, 3 wk 2, 2 wk 1  . We plan to work on functional strength, balance and gait for safety eudcation to decrease her fall risk. Thank you for this referral.  If you have any questions reguarding her care, please feel free to contact me at [phone]. Kendall Felix.  Soraida Harmon Yes

## 2021-11-09 ENCOUNTER — TRANSCRIPTION ENCOUNTER (OUTPATIENT)
Age: 31
End: 2021-11-09

## 2021-11-09 DIAGNOSIS — K85.90 ACUTE PANCREATITIS WITHOUT NECROSIS OR INFECTION, UNSPECIFIED: ICD-10-CM

## 2021-11-09 LAB
ALBUMIN SERPL ELPH-MCNC: 3.3 G/DL — SIGNIFICANT CHANGE UP (ref 3.3–5)
ALBUMIN SERPL ELPH-MCNC: 3.6 G/DL — SIGNIFICANT CHANGE UP (ref 3.3–5)
ALP SERPL-CCNC: 82 U/L — SIGNIFICANT CHANGE UP (ref 40–120)
ALP SERPL-CCNC: 86 U/L — SIGNIFICANT CHANGE UP (ref 40–120)
ALT FLD-CCNC: 27 U/L — SIGNIFICANT CHANGE UP (ref 10–45)
ALT FLD-CCNC: 30 U/L — SIGNIFICANT CHANGE UP (ref 10–45)
ANION GAP SERPL CALC-SCNC: 12 MMOL/L — SIGNIFICANT CHANGE UP (ref 5–17)
ANION GAP SERPL CALC-SCNC: 9 MMOL/L — SIGNIFICANT CHANGE UP (ref 5–17)
AST SERPL-CCNC: 21 U/L — SIGNIFICANT CHANGE UP (ref 10–40)
AST SERPL-CCNC: 24 U/L — SIGNIFICANT CHANGE UP (ref 10–40)
BASOPHILS # BLD AUTO: 0.05 K/UL — SIGNIFICANT CHANGE UP (ref 0–0.2)
BASOPHILS # BLD AUTO: 0.05 K/UL — SIGNIFICANT CHANGE UP (ref 0–0.2)
BASOPHILS NFR BLD AUTO: 0.6 % — SIGNIFICANT CHANGE UP (ref 0–2)
BASOPHILS NFR BLD AUTO: 0.8 % — SIGNIFICANT CHANGE UP (ref 0–2)
BILIRUB SERPL-MCNC: 0.2 MG/DL — SIGNIFICANT CHANGE UP (ref 0.2–1.2)
BILIRUB SERPL-MCNC: 0.2 MG/DL — SIGNIFICANT CHANGE UP (ref 0.2–1.2)
BLD GP AB SCN SERPL QL: SIGNIFICANT CHANGE UP
BUN SERPL-MCNC: 7 MG/DL — SIGNIFICANT CHANGE UP (ref 7–23)
BUN SERPL-MCNC: 9 MG/DL — SIGNIFICANT CHANGE UP (ref 7–23)
CALCIUM SERPL-MCNC: 8.4 MG/DL — SIGNIFICANT CHANGE UP (ref 8.4–10.5)
CALCIUM SERPL-MCNC: 8.5 MG/DL — SIGNIFICANT CHANGE UP (ref 8.4–10.5)
CHLORIDE SERPL-SCNC: 106 MMOL/L — SIGNIFICANT CHANGE UP (ref 96–108)
CHLORIDE SERPL-SCNC: 109 MMOL/L — HIGH (ref 96–108)
CHOLEST SERPL-MCNC: 128 MG/DL — SIGNIFICANT CHANGE UP
CO2 SERPL-SCNC: 23 MMOL/L — SIGNIFICANT CHANGE UP (ref 22–31)
CO2 SERPL-SCNC: 24 MMOL/L — SIGNIFICANT CHANGE UP (ref 22–31)
CREAT SERPL-MCNC: 0.76 MG/DL — SIGNIFICANT CHANGE UP (ref 0.5–1.3)
CREAT SERPL-MCNC: 0.9 MG/DL — SIGNIFICANT CHANGE UP (ref 0.5–1.3)
D DIMER BLD IA.RAPID-MCNC: <150 NG/ML DDU — SIGNIFICANT CHANGE UP
EOSINOPHIL # BLD AUTO: 0.03 K/UL — SIGNIFICANT CHANGE UP (ref 0–0.5)
EOSINOPHIL # BLD AUTO: 0.04 K/UL — SIGNIFICANT CHANGE UP (ref 0–0.5)
EOSINOPHIL NFR BLD AUTO: 0.5 % — SIGNIFICANT CHANGE UP (ref 0–6)
EOSINOPHIL NFR BLD AUTO: 0.5 % — SIGNIFICANT CHANGE UP (ref 0–6)
GLUCOSE SERPL-MCNC: 109 MG/DL — HIGH (ref 70–99)
GLUCOSE SERPL-MCNC: 145 MG/DL — HIGH (ref 70–99)
HCG SERPL-ACNC: <1 MIU/ML — SIGNIFICANT CHANGE UP
HCT VFR BLD CALC: 34.3 % — LOW (ref 34.5–45)
HCT VFR BLD CALC: 34.6 % — SIGNIFICANT CHANGE UP (ref 34.5–45)
HDLC SERPL-MCNC: 45 MG/DL — LOW
HGB BLD-MCNC: 11.2 G/DL — LOW (ref 11.5–15.5)
HGB BLD-MCNC: 11.4 G/DL — LOW (ref 11.5–15.5)
IMM GRANULOCYTES NFR BLD AUTO: 0.3 % — SIGNIFICANT CHANGE UP (ref 0–1.5)
IMM GRANULOCYTES NFR BLD AUTO: 0.4 % — SIGNIFICANT CHANGE UP (ref 0–1.5)
LIDOCAIN IGE QN: 3339 U/L — HIGH (ref 73–393)
LIDOCAIN IGE QN: 507 U/L — HIGH (ref 73–393)
LIPID PNL WITH DIRECT LDL SERPL: 64 MG/DL — SIGNIFICANT CHANGE UP
LYMPHOCYTES # BLD AUTO: 2.63 K/UL — SIGNIFICANT CHANGE UP (ref 1–3.3)
LYMPHOCYTES # BLD AUTO: 2.72 K/UL — SIGNIFICANT CHANGE UP (ref 1–3.3)
LYMPHOCYTES # BLD AUTO: 31.6 % — SIGNIFICANT CHANGE UP (ref 13–44)
LYMPHOCYTES # BLD AUTO: 41.3 % — SIGNIFICANT CHANGE UP (ref 13–44)
MCHC RBC-ENTMCNC: 25.6 PG — LOW (ref 27–34)
MCHC RBC-ENTMCNC: 26.1 PG — LOW (ref 27–34)
MCHC RBC-ENTMCNC: 32.4 GM/DL — SIGNIFICANT CHANGE UP (ref 32–36)
MCHC RBC-ENTMCNC: 33.2 GM/DL — SIGNIFICANT CHANGE UP (ref 32–36)
MCV RBC AUTO: 78.5 FL — LOW (ref 80–100)
MCV RBC AUTO: 79.2 FL — LOW (ref 80–100)
MONOCYTES # BLD AUTO: 0.44 K/UL — SIGNIFICANT CHANGE UP (ref 0–0.9)
MONOCYTES # BLD AUTO: 0.52 K/UL — SIGNIFICANT CHANGE UP (ref 0–0.9)
MONOCYTES NFR BLD AUTO: 6.2 % — SIGNIFICANT CHANGE UP (ref 2–14)
MONOCYTES NFR BLD AUTO: 6.7 % — SIGNIFICANT CHANGE UP (ref 2–14)
NEUTROPHILS # BLD AUTO: 3.33 K/UL — SIGNIFICANT CHANGE UP (ref 1.8–7.4)
NEUTROPHILS # BLD AUTO: 5.06 K/UL — SIGNIFICANT CHANGE UP (ref 1.8–7.4)
NEUTROPHILS NFR BLD AUTO: 50.4 % — SIGNIFICANT CHANGE UP (ref 43–77)
NEUTROPHILS NFR BLD AUTO: 60.7 % — SIGNIFICANT CHANGE UP (ref 43–77)
NON HDL CHOLESTEROL: 83 MG/DL — SIGNIFICANT CHANGE UP
NRBC # BLD: 0 /100 WBCS — SIGNIFICANT CHANGE UP (ref 0–0)
NRBC # BLD: 0 /100 WBCS — SIGNIFICANT CHANGE UP (ref 0–0)
PLATELET # BLD AUTO: 205 K/UL — SIGNIFICANT CHANGE UP (ref 150–400)
PLATELET # BLD AUTO: 210 K/UL — SIGNIFICANT CHANGE UP (ref 150–400)
POTASSIUM SERPL-MCNC: 3.8 MMOL/L — SIGNIFICANT CHANGE UP (ref 3.5–5.3)
POTASSIUM SERPL-MCNC: 3.9 MMOL/L — SIGNIFICANT CHANGE UP (ref 3.5–5.3)
POTASSIUM SERPL-SCNC: 3.8 MMOL/L — SIGNIFICANT CHANGE UP (ref 3.5–5.3)
POTASSIUM SERPL-SCNC: 3.9 MMOL/L — SIGNIFICANT CHANGE UP (ref 3.5–5.3)
PROT SERPL-MCNC: 6.6 G/DL — SIGNIFICANT CHANGE UP (ref 6–8.3)
PROT SERPL-MCNC: 7.1 G/DL — SIGNIFICANT CHANGE UP (ref 6–8.3)
RBC # BLD: 4.37 M/UL — SIGNIFICANT CHANGE UP (ref 3.8–5.2)
RBC # BLD: 4.37 M/UL — SIGNIFICANT CHANGE UP (ref 3.8–5.2)
RBC # FLD: 15.3 % — HIGH (ref 10.3–14.5)
RBC # FLD: 15.6 % — HIGH (ref 10.3–14.5)
SARS-COV-2 RNA SPEC QL NAA+PROBE: SIGNIFICANT CHANGE UP
SODIUM SERPL-SCNC: 141 MMOL/L — SIGNIFICANT CHANGE UP (ref 135–145)
SODIUM SERPL-SCNC: 142 MMOL/L — SIGNIFICANT CHANGE UP (ref 135–145)
TRIGL SERPL-MCNC: 96 MG/DL — SIGNIFICANT CHANGE UP
TROPONIN I, HIGH SENSITIVITY RESULT: <4 NG/L — SIGNIFICANT CHANGE UP
WBC # BLD: 6.59 K/UL — SIGNIFICANT CHANGE UP (ref 3.8–10.5)
WBC # BLD: 8.33 K/UL — SIGNIFICANT CHANGE UP (ref 3.8–10.5)
WBC # FLD AUTO: 6.59 K/UL — SIGNIFICANT CHANGE UP (ref 3.8–10.5)
WBC # FLD AUTO: 8.33 K/UL — SIGNIFICANT CHANGE UP (ref 3.8–10.5)

## 2021-11-09 PROCEDURE — 99223 1ST HOSP IP/OBS HIGH 75: CPT

## 2021-11-09 PROCEDURE — 76705 ECHO EXAM OF ABDOMEN: CPT | Mod: 26,RT

## 2021-11-09 PROCEDURE — 74177 CT ABD & PELVIS W/CONTRAST: CPT | Mod: 26,MA

## 2021-11-09 RX ORDER — MORPHINE SULFATE 50 MG/1
2 CAPSULE, EXTENDED RELEASE ORAL EVERY 6 HOURS
Refills: 0 | Status: DISCONTINUED | OUTPATIENT
Start: 2021-11-09 | End: 2021-11-10

## 2021-11-09 RX ORDER — PANTOPRAZOLE SODIUM 20 MG/1
40 TABLET, DELAYED RELEASE ORAL
Refills: 0 | Status: DISCONTINUED | OUTPATIENT
Start: 2021-11-09 | End: 2021-11-10

## 2021-11-09 RX ORDER — SODIUM CHLORIDE 9 MG/ML
1000 INJECTION INTRAMUSCULAR; INTRAVENOUS; SUBCUTANEOUS ONCE
Refills: 0 | Status: COMPLETED | OUTPATIENT
Start: 2021-11-09 | End: 2021-11-09

## 2021-11-09 RX ORDER — SODIUM CHLORIDE 9 MG/ML
1000 INJECTION, SOLUTION INTRAVENOUS
Refills: 0 | Status: DISCONTINUED | OUTPATIENT
Start: 2021-11-09 | End: 2021-11-10

## 2021-11-09 RX ORDER — CHLORHEXIDINE GLUCONATE 213 G/1000ML
1 SOLUTION TOPICAL ONCE
Refills: 0 | Status: COMPLETED | OUTPATIENT
Start: 2021-11-10 | End: 2021-11-10

## 2021-11-09 RX ORDER — ACETAMINOPHEN 500 MG
650 TABLET ORAL EVERY 6 HOURS
Refills: 0 | Status: DISCONTINUED | OUTPATIENT
Start: 2021-11-09 | End: 2021-11-10

## 2021-11-09 RX ADMIN — SODIUM CHLORIDE 1000 MILLILITER(S): 9 INJECTION INTRAMUSCULAR; INTRAVENOUS; SUBCUTANEOUS at 03:56

## 2021-11-09 RX ADMIN — SODIUM CHLORIDE 100 MILLILITER(S): 9 INJECTION, SOLUTION INTRAVENOUS at 22:03

## 2021-11-09 RX ADMIN — SODIUM CHLORIDE 1000 MILLILITER(S): 9 INJECTION INTRAMUSCULAR; INTRAVENOUS; SUBCUTANEOUS at 01:07

## 2021-11-09 RX ADMIN — Medication 30 MILLIGRAM(S): at 01:31

## 2021-11-09 RX ADMIN — PANTOPRAZOLE SODIUM 40 MILLIGRAM(S): 20 TABLET, DELAYED RELEASE ORAL at 03:41

## 2021-11-09 RX ADMIN — SODIUM CHLORIDE 100 MILLILITER(S): 9 INJECTION, SOLUTION INTRAVENOUS at 03:56

## 2021-11-09 NOTE — H&P ADULT - NSHPREVIEWOFSYSTEMS_GEN_ALL_CORE
CONSTITUTIONAL: No fever, weight loss, or fatigue  EYES: No eye pain, visual disturbances, or discharge  ENMT:  No difficulty hearing, tinnitus, vertigo; No sinus or throat pain  NECK: No pain or stiffness  RESPIRATORY: No cough, wheezing, chills or hemoptysis; No shortness of breath  CARDIOVASCULAR: + chest pain, no palpitations, dizziness, or leg swelling  GASTROINTESTINAL: No abdominal or epigastric pain. No nausea, vomiting, or hematemesis; No diarrhea or constipation. No melena or hematochezia.  GENITOURINARY: No dysuria, frequency, hematuria, or incontinence  NEUROLOGICAL: No headaches, memory loss, loss of strength, numbness, or tremors  SKIN: No itching, burning, rashes, or lesions   LYMPH NODES: No enlarged glands  ENDOCRINE: No heat or cold intolerance; No hair loss  MUSCULOSKELETAL: No joint pain or swelling; No muscle, back, or extremity pain  PSYCHIATRIC: No depression, anxiety, mood swings, or difficulty sleeping  HEME/LYMPH: No easy bruising, or bleeding gums  ALLERY AND IMMUNOLOGIC: No hives or eczema    IMPROVE VTE Individual Risk Assessment          RISK                                                          Points  [  ] Previous VTE                                                3  [  ] Thrombophilia                                             2  [  ] Lower limb paralysis                                   2        (unable to hold up >15 seconds)    [  ] Current Cancer                                             2         (within 6 months)  [  ] Immobilization > 24 hrs                              1  [  ] ICU/CCU stay > 24 hours                             1  [  ] Age > 60                                                         1    IMPROVE VTE Score:         [   0      ]    Total Risk Factor Score:    0 - 1:   Consider IPC  >2 - 3:  Thromboprophylaxis required (enoxaparin or SQ heparin)        >4:   High Risk: Thromboprophylaxis required (enoxaparin or SQ heparin), optional add IPC  **If CONTRAINDICATION to enoxaparin or SQ heparin, USE IPCs**

## 2021-11-09 NOTE — CONSULT NOTE ADULT - ATTENDING COMMENTS
Patient seen and examined with Paty Chadwick NP.  Agree with assessment and plan as above.    Young female admitted with RUQ abdominal pain.  No fever or chills.  Imaging shows cholelithiasis, elevated lipase.  No nausea or vomiting.  Bilirubin normal now.    VSS.  Abdomen soft, nontender    Gallstone pancreatitis, improved  Agree with decision to proceed with cholecystectomy  No indication for ERCP at this time

## 2021-11-09 NOTE — CONSULT NOTE ADULT - PROBLEM SELECTOR RECOMMENDATION 9
Most likely gallstone pancreatitis  Monitor LFTs, Lipase  Continue IV fluids  Clear liquids ok  Surgical recommendations appreciated

## 2021-11-09 NOTE — ED PROVIDER NOTE - CLINICAL SUMMARY MEDICAL DECISION MAKING FREE TEXT BOX
30 yo F no PMH c/o sternal chest pain tonight . no other sxs. mild sternal and epigast ttp. OCP use. check ddimer r/o pe. check ekg, labs,trop, cxr.  check lipase r/o pancreatitis. toradol for pain. reassess    ekg, cxr, trop , ddimer neg.  lipase elevated 3300.  pt denies any alcohol use or gallstone hx.  will check ct abd. give iv fluids

## 2021-11-09 NOTE — ED PROVIDER NOTE - OBJECTIVE STATEMENT
pt c/o low sternal chest pain, sharp, moderate, since 2200 tonight. no sob, cough, n/v, dizziness. no radiation. no fever/chills. no abd pain. no drug use. uses OCPs. no leg pain/swelling /recent periods of immobility.

## 2021-11-09 NOTE — H&P ADULT - NSHPPHYSICALEXAM_GEN_ALL_CORE
Vital Signs Last 24 Hrs  T(C): 37 (08 Nov 2021 22:53), Max: 37 (08 Nov 2021 22:53)  T(F): 98.6 (08 Nov 2021 22:53), Max: 98.6 (08 Nov 2021 22:53)  HR: 69 (09 Nov 2021 00:01) (69 - 76)  BP: 100/69 (09 Nov 2021 00:01) (100/69 - 141/89)  BP(mean): --  RR: 17 (09 Nov 2021 00:01) (16 - 17)  SpO2: 100% (09 Nov 2021 00:01) (100% - 100%)  Daily Height in cm: 157.48 (08 Nov 2021 22:53)    Daily   CAPILLARY BLOOD GLUCOSE        I&O's Summary      GENERAL: NAD  HEAD:  Normocephalic  EYES: EOMI, PERRLA, conjunctiva and sclera clear  ENMT: No tonsillar erythema, exudates, or enlargement; Moist mucous membranes, No lesions  NECK: Supple, No JVD, no bruit, normal thyroid  NERVOUS SYSTEM:  Alert & Oriented X3, grossly moves all fours  CHEST/LUNG: Clear to auscultation bilaterally; No rales, rhonchi, wheezing, or rubs  HEART: Regular rate and rhythm; No murmurs, rubs, or gallops  ABDOMEN: Soft, + mod RUQ tenderness on palp. neg Richey, mild mid epigastric tenderness on palp., Nondistended; Bowel sounds present. no CVA tenderness.   EXTREMITIES:  2+ Peripheral Pulses, No clubbing, cyanosis, or edema  LYMPH: No lymphadenopathy noted  SKIN: No rashes or lesions

## 2021-11-09 NOTE — H&P ADULT - HISTORY OF PRESENT ILLNESS
31 Hebrew speaking F with no sigPMHx pw chest pain. She stated sxs started acutely with sharp, severe 10/10 retrosternal chest pain that travel across the chest with no assoc SOB, diaphoresis, palps, HA, dizziness, nausea or vomiting. Denied any fevers, chills, cough, abd pain, flank pain or dysuria. Sxs have improved s/p IV toradol in ED and now about 4/10. She recalled having similar but milder sxs in the past that resolved on its own. Does not recall hx of cholelithiasis even though on chart review 8/29/17, pt had presented with RUQ pain with abd US demonstrating sludge and gallstones and neg HIDA scan.  at bedside. In ED, afebrile P: 76 BP: 141/89 sat 100% on RA. WBC: 8.33 d-dimer neg, trop neg, LFTs wnl with elevated lipase 3339.   CTAP pending.

## 2021-11-09 NOTE — CHART NOTE - NSCHARTNOTEFT_GEN_A_CORE
31F hx of cholelithiasis pw chest pain with pancreatitis.    #Pancreatitis likely sec to cholelithiasis  CTAP with cholelithiasis with borderline GB wall thickening  AUS with cholelithiasis and no bilary dilation  Lipase 3339--> 507  Clear liquid diet for now  Cont LR @ 100 cc hr   Surgery- DR Curtis-NPO @ [place] and plan for OR tomorrow    Dispo: Pending OR tomorrow. Pt hesitant for OR. If she refuses surgery and wants to be discharged, it will be AMA.     Case d/w DR Curtis

## 2021-11-09 NOTE — CONSULT NOTE ADULT - ASSESSMENT
32 y/o female admitted with abdominal pain radiating to chest, found to have elevated lipase, and cholelithiases on CT imaging. Lipase down trending. No dilated CBD or choledocholithiases seen on imaging. Discussed gallbladder removal with patient and , 
classic gallstone pancreatitis      no further imaging indicated      I put on OR schedule for Wednesday morning for laparoscopic cholecystectomy.    I explained rationale and procedure to patient via [employer].      if lipase comes down today can have clear liquid diet and then NPO past midnight tonight.    Encourage ambulation.    Thank you    Dr. Thom Curtis  cell#[phone]

## 2021-11-09 NOTE — H&P ADULT - ASSESSMENT
31F hx of cholelithiasis pw chest pain with pancreatitis.    #Pancreatitis likely sec to cholelithiasis  Await results of CT.   Keep NPO for now.   IVFs  trend LFTs.  GI and surgery consult.     #DVT/GI proph.  31F hx of cholelithiasis pw chest pain with pancreatitis.    #Pancreatitis likely sec to cholelithiasis  Await results of CT.   Keep NPO for now.   IVFs  trend LFTs.  GI and surgery consult.     ADDENDUM: < from: CT Abdomen and Pelvis w/ IV Cont (11.09.21 @ 02:19) >  IMPRESSION:    No CT evidence of pancreatitis. Cholelithiasis with borderline gallbladder wall thickening; consider ultrasound and/or nuclear medicine HIDA scan for further evaluation.    RUQ abd US ordered.     #DVT/GI proph.

## 2021-11-09 NOTE — CONSULT NOTE ADULT - SUBJECTIVE AND OBJECTIVE BOX
Hospitalist note:    History of Present Illness:   31 Czech speaking F with no sigPMHx pw chest pain. She stated sxs started acutely with sharp, severe 10/10 retrosternal chest pain that travel across the chest with no assoc SOB, diaphoresis, palps, HA, dizziness, nausea or vomiting. Denied any fevers, chills, cough, abd pain, flank pain or dysuria. Sxs have improved s/p IV toradol in ED and now about 4/10. She recalled having similar but milder sxs in the past that resolved on its own. Does not recall hx of cholelithiasis even though on chart review 8/29/17, pt had presented with RUQ pain with abd US demonstrating sludge and gallstones and neg HIDA scan.  at bedside. In ED, afebrile P: 76 BP: 141/89 sat 100% on RA. WBC: 8.33 d-dimer neg, trop neg, LFTs wnl with elevated lipase 3339.   CTAP pending.               patient interviewed in room geriatrics 220    I used [employer] translation [employer]      as above      comfortable    afebrile    vitals stable    Heent-sclera anicteric    labs:    Complete Blood Count + Automated Diff in AM (11.09.21 @ 07:15)   WBC Count: 6.59 K/uL   RBC Count: 4.37 M/uL   Hemoglobin: 11.4 g/dL   Hematocrit: 34.3 %   Mean Cell Volume: 78.5 fl   Mean Cell Hemoglobin: 26.1 pg   Mean Cell Hemoglobin Conc: 33.2 gm/dL   Red Cell Distrib Width: 15.6 %   Platelet Count - Automated: 205 K/uL   Auto Neutrophil #: 3.33 K/uL   Auto Lymphocyte #: 2.72 K/uL   Auto Monocyte #: 0.44 K/uL   Auto Eosinophil #: 0.03 K/uL   Auto Basophil #: 0.0    COVID-19 PCR: NotDetec: You can help in the fight against COVID-19. [facility] may contact   [facility] Metabolic Panel (11.08.21 @ 23:21)   Sodium, Serum: 141 mmol/L   Potassium, Serum: 3.8 mmol/L   Chloride, Serum: 106 mmol/L   Carbon Dioxide, Serum: 23 mmol/L   Anion Gap, Serum: 12 mmol/L   Blood Urea Nitrogen, Serum: 9 mg/dL   Creatinine, Serum: 0.90 mg/dL   Glucose, Serum: 145 mg/dL   Calcium, Total Serum: 8.4 mg/dL   Protein Total, Serum: 7.1 g/dL   Albumin, Serum: 3.6 g/dL   Bilirubin Total, Serum: 0.2 mg/dL   Alkaline Phosphatas      imaging(personally reviewed):    < from: CT Abdomen and Pelvis w/ IV Cont (11.09.21 @ 02:19) >  Cholelithiasis with borderline gallbladder wall thickening; consider ultrasound and/or n    < end of copied text >      
INTERVAL HPI/OVERNIGHT EVENTS:  HPI:  31 Greenlandic speaking F with no sigPMHx pw chest pain. She stated sxs started acutely with sharp, severe 10/10 retrosternal chest pain that travel across the chest with no assoc SOB, diaphoresis, palps, HA, dizziness, nausea or vomiting. Denied any fevers, chills, cough, abd pain, flank pain or dysuria. Sxs have improved s/p IV toradol in ED and now about 4/10. She recalled having similar but milder sxs in the past that resolved on its own. Does not recall hx of cholelithiasis even though on chart review 17, pt had presented with RUQ pain with abd US demonstrating sludge and gallstones and neg HIDA scan.  at bedside. In ED, afebrile P: 76 BP: 141/89 sat 100% on RA. WBC: 8.33 d-dimer neg, trop neg, LFTs wnl with elevated lipase 3339.   CTAP pending.  (2021 02:10)    GI consulted to see patient due to gallstone pancreatitis. Patient seen and examined at bedside,  present during exam. Per patient no previous history of symptoms. Denies nausea, vomiting, fever/chills. No etoh abuse.     MEDICATIONS  (STANDING):  lactated ringers. 1000 milliLiter(s) (100 mL/Hr) IV Continuous <Continuous>  pantoprazole    Tablet 40 milliGRAM(s) Oral before breakfast    MEDICATIONS  (PRN):  acetaminophen     Tablet .. 650 milliGRAM(s) Oral every 6 hours PRN Temp greater or equal to 38C (100.4F), Mild Pain (1 - 3)  morphine  - Injectable 2 milliGRAM(s) IV Push every 6 hours PRN Severe Pain (7 - 10)      Allergies    No Known Allergies    Intolerances        PAST MEDICAL & SURGICAL HISTORY:  No pertinent past medical history    No significant past surgical history        REVIEW OF SYSTEMS: negative unless indicated in HPI    non smoker  no etoh abuse       PHYSICAL EXAM:   Vital Signs:  Vital Signs Last 24 Hrs  T(C): 36.6 (2021 06:55), Max: 37 (2021 22:53)  T(F): 97.8 (2021 06:55), Max: 98.6 (2021 22:53)  HR: 70 (2021 06:55) (69 - 76)  BP: 102/53 (2021 06:55) (100/69 - 141/89)  BP(mean): --  RR: 16 (2021 06:55) (16 - 17)  SpO2: 100% (2021 06:55) (100% - 100%)  Daily Height in cm: 152.4 (2021 06:55)    Daily Weight in k.9 (2021 06:55)I&O's Summary    2021 07:01  -  2021 15:46  --------------------------------------------------------  IN: 300 mL / OUT: 0 mL / NET: 300 mL        GENERAL:  Appears stated age  HEENT:  NC/AT,  conjunctivae clear and pink,   CHEST:  Full & symmetric excursion, no increased effort, breath sounds clear  HEART:  Regular rhythm, S1, S2, no murmur  ABDOMEN:  Soft, non-tender, non-distended, normoactive bowel sounds  EXTEREMITIES:  no edema  SKIN:  No rash, warm/dry  NEURO:  Alert, oriented,      LABS:                        11.4   6.59  )-----------( 205      ( 2021 07:15 )             34.3         142  |  109<H>  |  7   ----------------------------<  109<H>  3.9   |  24  |  0.76    Ca    8.5      2021 07:15    TPro  6.6  /  Alb  3.3  /  TBili  0.2  /  DBili  x   /  AST  24  /  ALT  30  /  AlkPhos  82          amylase   lipaseLipase, Serum: 507 U/L ( @ 07:15)  Lipase, Serum: 3339 U/L ( @ 23:21)    RADIOLOGY & ADDITIONAL TESTS:

## 2021-11-09 NOTE — ED PROVIDER NOTE - CARDIAC, MLM
Normal rate, regular rhythm.  Heart sounds S1, S2.  No murmurs, rubs or gallops. 2+ rad pulses. mild low sternal ttp. no lesions

## 2021-11-09 NOTE — H&P ADULT - NSHPLABSRESULTS_GEN_ALL_CORE
11.2   8.33  )-----------( 210      ( 08 Nov 2021 23:21 )             34.6       11-08    141  |  106  |  9   ----------------------------<  145<H>  3.8   |  23  |  0.90    Ca    8.4      08 Nov 2021 23:21    TPro  7.1  /  Alb  3.6  /  TBili  0.2  /  DBili  x   /  AST  21  /  ALT  27  /  AlkPhos  86  11-08         LIVER FUNCTIONS - ( 08 Nov 2021 23:21 )  Alb: 3.6 g/dL / Pro: 7.1 g/dL / ALK PHOS: 86 U/L / ALT: 27 U/L / AST: 21 U/L / GGT: x             Lipase, Serum: 3339 U/L (11-08-21 @ 23:21)        CAPILLARY BLOOD GLUCOSE        11-08 @ 23:21  <150 ng/mL DDU      EKG: personally rev. NSR at 90bpm, no acute ST changes      CXR: wet read. personally rev. NAPD    CTAP: PENDING.

## 2021-11-10 ENCOUNTER — TRANSCRIPTION ENCOUNTER (OUTPATIENT)
Age: 31
End: 2021-11-10

## 2021-11-10 ENCOUNTER — RESULT REVIEW (OUTPATIENT)
Age: 31
End: 2021-11-10

## 2021-11-10 LAB
ALBUMIN SERPL ELPH-MCNC: 3.2 G/DL — LOW (ref 3.3–5)
ALP SERPL-CCNC: 84 U/L — SIGNIFICANT CHANGE UP (ref 40–120)
ALT FLD-CCNC: 28 U/L — SIGNIFICANT CHANGE UP (ref 10–45)
ANION GAP SERPL CALC-SCNC: 8 MMOL/L — SIGNIFICANT CHANGE UP (ref 5–17)
APTT BLD: 27.6 SEC — SIGNIFICANT CHANGE UP (ref 27.5–35.5)
AST SERPL-CCNC: 21 U/L — SIGNIFICANT CHANGE UP (ref 10–40)
BILIRUB SERPL-MCNC: 0.4 MG/DL — SIGNIFICANT CHANGE UP (ref 0.2–1.2)
BUN SERPL-MCNC: 4 MG/DL — LOW (ref 7–23)
CALCIUM SERPL-MCNC: 8.2 MG/DL — LOW (ref 8.4–10.5)
CHLORIDE SERPL-SCNC: 105 MMOL/L — SIGNIFICANT CHANGE UP (ref 96–108)
CO2 SERPL-SCNC: 26 MMOL/L — SIGNIFICANT CHANGE UP (ref 22–31)
COVID-19 NUCLEOCAPSID GAM AB INTERP: NEGATIVE — SIGNIFICANT CHANGE UP
COVID-19 NUCLEOCAPSID TOTAL GAM ANTIBODY RESULT: 0.12 INDEX — SIGNIFICANT CHANGE UP
COVID-19 SPIKE DOMAIN AB INTERP: POSITIVE
COVID-19 SPIKE DOMAIN ANTIBODY RESULT: >250 U/ML — HIGH
CREAT SERPL-MCNC: 0.86 MG/DL — SIGNIFICANT CHANGE UP (ref 0.5–1.3)
GLUCOSE SERPL-MCNC: 95 MG/DL — SIGNIFICANT CHANGE UP (ref 70–99)
HCT VFR BLD CALC: 35.7 % — SIGNIFICANT CHANGE UP (ref 34.5–45)
HGB BLD-MCNC: 11.5 G/DL — SIGNIFICANT CHANGE UP (ref 11.5–15.5)
INR BLD: 1.16 RATIO — SIGNIFICANT CHANGE UP (ref 0.88–1.16)
LIDOCAIN IGE QN: 121 U/L — SIGNIFICANT CHANGE UP (ref 73–393)
MCHC RBC-ENTMCNC: 25.4 PG — LOW (ref 27–34)
MCHC RBC-ENTMCNC: 32.2 GM/DL — SIGNIFICANT CHANGE UP (ref 32–36)
MCV RBC AUTO: 79 FL — LOW (ref 80–100)
NRBC # BLD: 0 /100 WBCS — SIGNIFICANT CHANGE UP (ref 0–0)
PLATELET # BLD AUTO: 208 K/UL — SIGNIFICANT CHANGE UP (ref 150–400)
POTASSIUM SERPL-MCNC: 3.6 MMOL/L — SIGNIFICANT CHANGE UP (ref 3.5–5.3)
POTASSIUM SERPL-SCNC: 3.6 MMOL/L — SIGNIFICANT CHANGE UP (ref 3.5–5.3)
PROT SERPL-MCNC: 6.4 G/DL — SIGNIFICANT CHANGE UP (ref 6–8.3)
PROTHROM AB SERPL-ACNC: 13.9 SEC — HIGH (ref 10.6–13.6)
RBC # BLD: 4.52 M/UL — SIGNIFICANT CHANGE UP (ref 3.8–5.2)
RBC # FLD: 15.8 % — HIGH (ref 10.3–14.5)
SARS-COV-2 IGG+IGM SERPL QL IA: 0.12 INDEX — SIGNIFICANT CHANGE UP
SARS-COV-2 IGG+IGM SERPL QL IA: >250 U/ML — HIGH
SARS-COV-2 IGG+IGM SERPL QL IA: NEGATIVE — SIGNIFICANT CHANGE UP
SARS-COV-2 IGG+IGM SERPL QL IA: POSITIVE
SODIUM SERPL-SCNC: 139 MMOL/L — SIGNIFICANT CHANGE UP (ref 135–145)
WBC # BLD: 5.33 K/UL — SIGNIFICANT CHANGE UP (ref 3.8–10.5)
WBC # FLD AUTO: 5.33 K/UL — SIGNIFICANT CHANGE UP (ref 3.8–10.5)

## 2021-11-10 PROCEDURE — 47562 LAPAROSCOPIC CHOLECYSTECTOMY: CPT | Mod: 80

## 2021-11-10 PROCEDURE — 88304 TISSUE EXAM BY PATHOLOGIST: CPT | Mod: 26

## 2021-11-10 PROCEDURE — 99231 SBSQ HOSP IP/OBS SF/LOW 25: CPT

## 2021-11-10 PROCEDURE — 47562 LAPAROSCOPIC CHOLECYSTECTOMY: CPT

## 2021-11-10 PROCEDURE — 47562 LAPAROSCOPIC CHOLECYSTECTOMY: CPT | Mod: AS

## 2021-11-10 RX ORDER — ENOXAPARIN SODIUM 100 MG/ML
40 INJECTION SUBCUTANEOUS DAILY
Refills: 0 | Status: DISCONTINUED | OUTPATIENT
Start: 2021-11-11 | End: 2021-11-11

## 2021-11-10 RX ORDER — ACETAMINOPHEN 500 MG
650 TABLET ORAL EVERY 6 HOURS
Refills: 0 | Status: DISCONTINUED | OUTPATIENT
Start: 2021-11-10 | End: 2021-11-11

## 2021-11-10 RX ORDER — OXYCODONE AND ACETAMINOPHEN 5; 325 MG/1; MG/1
1 TABLET ORAL
Qty: 12 | Refills: 0
Start: 2021-11-10 | End: 2021-11-12

## 2021-11-10 RX ORDER — OXYCODONE HYDROCHLORIDE 5 MG/1
5 TABLET ORAL EVERY 4 HOURS
Refills: 0 | Status: DISCONTINUED | OUTPATIENT
Start: 2021-11-10 | End: 2021-11-11

## 2021-11-10 RX ORDER — HYDROMORPHONE HYDROCHLORIDE 2 MG/ML
0.5 INJECTION INTRAMUSCULAR; INTRAVENOUS; SUBCUTANEOUS EVERY 6 HOURS
Refills: 0 | Status: DISCONTINUED | OUTPATIENT
Start: 2021-11-10 | End: 2021-11-11

## 2021-11-10 RX ORDER — ONDANSETRON 8 MG/1
4 TABLET, FILM COATED ORAL EVERY 6 HOURS
Refills: 0 | Status: DISCONTINUED | OUTPATIENT
Start: 2021-11-10 | End: 2021-11-11

## 2021-11-10 RX ORDER — SODIUM CHLORIDE 9 MG/ML
1000 INJECTION, SOLUTION INTRAVENOUS
Refills: 0 | Status: DISCONTINUED | OUTPATIENT
Start: 2021-11-10 | End: 2021-11-11

## 2021-11-10 RX ORDER — HYDROMORPHONE HYDROCHLORIDE 2 MG/ML
0.5 INJECTION INTRAMUSCULAR; INTRAVENOUS; SUBCUTANEOUS
Refills: 0 | Status: DISCONTINUED | OUTPATIENT
Start: 2021-11-10 | End: 2021-11-10

## 2021-11-10 RX ADMIN — HYDROMORPHONE HYDROCHLORIDE 0.5 MILLIGRAM(S): 2 INJECTION INTRAMUSCULAR; INTRAVENOUS; SUBCUTANEOUS at 18:00

## 2021-11-10 RX ADMIN — HYDROMORPHONE HYDROCHLORIDE 0.5 MILLIGRAM(S): 2 INJECTION INTRAMUSCULAR; INTRAVENOUS; SUBCUTANEOUS at 14:38

## 2021-11-10 RX ADMIN — OXYCODONE HYDROCHLORIDE 5 MILLIGRAM(S): 5 TABLET ORAL at 23:31

## 2021-11-10 RX ADMIN — OXYCODONE HYDROCHLORIDE 5 MILLIGRAM(S): 5 TABLET ORAL at 22:33

## 2021-11-10 RX ADMIN — HYDROMORPHONE HYDROCHLORIDE 0.5 MILLIGRAM(S): 2 INJECTION INTRAMUSCULAR; INTRAVENOUS; SUBCUTANEOUS at 17:45

## 2021-11-10 RX ADMIN — Medication 650 MILLIGRAM(S): at 22:31

## 2021-11-10 RX ADMIN — PANTOPRAZOLE SODIUM 40 MILLIGRAM(S): 20 TABLET, DELAYED RELEASE ORAL at 05:05

## 2021-11-10 RX ADMIN — Medication 650 MILLIGRAM(S): at 23:31

## 2021-11-10 RX ADMIN — CHLORHEXIDINE GLUCONATE 1 APPLICATION(S): 213 SOLUTION TOPICAL at 05:06

## 2021-11-10 NOTE — DISCHARGE NOTE PROVIDER - PROVIDER RX CONTACT NUMBER
Quality 110: Preventive Care And Screening: Influenza Immunization: Influenza Immunization previously received during influenza season
Quality 111:Pneumonia Vaccination Status For Older Adults: Pneumococcal Vaccination Previously Received
Detail Level: Detailed
[phone]

## 2021-11-10 NOTE — PROGRESS NOTE ADULT - ASSESSMENT
31F hx of cholelithiasis pw chest pain with pancreatitis.    #Pancreatitis likely sec to gallstones  resolved  CTAP with cholelithiasis with borderline GB wall thickening  AUS with cholelithiasis and no bilary dilation  Lipase 3339--> 507 -->121  Cont LR @ 100 cc hr   Surgery Dr. Curtis. Plan for cholecystectomy today. Patient had prior questions regarding insurance and costs. Currently wants to proceed with surgery.  Will further reach out to SW.   NPO  Appreciate GI and surgery recs      #DVT/GI proph.   Ambulate     Dispo: home after

## 2021-11-10 NOTE — DISCHARGE NOTE NURSING/CASE MANAGEMENT/SOCIAL WORK - PATIENT PORTAL LINK FT
You can access the FollowMyHealth Patient Portal offered by [facility] by registering at the following website: [email or web address]. By joining [employer]’s FollowMyHealth portal, you will also be able to view your health information using other applications (apps) compatible with our system.

## 2021-11-10 NOTE — DISCHARGE NOTE PROVIDER - NSDCFUADDAPPT_GEN_ALL_CORE_FT
We made you a new patient hospital follow up appointment with Family Practice Dr. Scott on 11/17/2021 at 10:30am, office #[phone]. We made you a new patient hospital follow up appointment with Family Practice Dr. Scott on 11/17/2021 at 10:30am, office #[phone].    Follow up with Dr Curtis, Friday 11/19: call for appt

## 2021-11-10 NOTE — DISCHARGE NOTE NURSING/CASE MANAGEMENT/SOCIAL WORK - NSDCFUADDAPPT_GEN_ALL_CORE_FT
We made you a new patient hospital follow up appointment with Family Practice Dr. Scott on 11/17/2021 at 10:30am, office #[phone].

## 2021-11-10 NOTE — DISCHARGE NOTE PROVIDER - HOSPITAL COURSE
31F hx of cholelithiasis c/o chest pain admitted with pancreatitis. Patient stated sxs started acutely with sharp, severe 10/10 retrosternal chest pain that travel across the chest with no assoc SOB, diaphoresis, palps, HA, dizziness, nausea or vomiting. Labs revealing elevated lipase at 3339. Abdominal US demonstrating cholelithiasis. CTAP with cholelithiasis with borderline GB wall thickening. Lipase 3339--> 507 -->121. S/p lap nicola with Dr. Curtis. No complications noted. Gallstones seen and removed.       Code Status: Full       Discharging Provider:  Uche Loza NP  Contact Info: [phone]. Please call with any questions or concerns.    Case discussed with Dr. Curtis  Outpatient Provider: Family clinic            31F hx of cholelithiasis c/o chest pain admitted with pancreatitis. Patient stated sxs started acutely with sharp, severe 10/10 retrosternal chest pain that travel across the chest with no assoc SOB, diaphoresis, palps, HA, dizziness, nausea or vomiting. Labs revealing elevated lipase at 3339. Abdominal US demonstrating cholelithiasis. CTAP with cholelithiasis with borderline GB wall thickening. Lipase 3339--> 507 -->121. S/p lap nicola with Dr. Curtis. No complications noted. Gallstones seen and removed.       Code Status: Full       Discharging Provider:  Azul SOSA  Contact Info: [phone]. Please call with any questions or concerns.    Case discussed with Dr. Curtis  Outpatient Provider: Family clinic            31F hx of cholelithiasis c/o chest pain admitted with pancreatitis. Patient stated sxs started acutely with sharp, severe 10/10 retrosternal chest pain that travel across the chest with no assoc SOB, diaphoresis, palps, HA, dizziness, nausea or vomiting. Labs revealing elevated lipase at 3339. Abdominal US demonstrating cholelithiasis. CTAP with cholelithiasis with borderline GB wall thickening. Lipase 3339--> 507 -->121. S/p lap nicola with Dr. Curtis. No complications noted. Gallstones seen and removed.       Code Status: Full       Discharging Provider:  Azul SSOA  Contact Info: [phone]. Please call with any questions or concerns.    Case discussed with Dr. Curtis  Outpatient Provider: Family clinic

## 2021-11-10 NOTE — DISCHARGE NOTE PROVIDER - CARE PROVIDER_API CALL
Thom Curtis)  Surgery  [address], [address]  [place] [address]  Phone: [phone]  Fax: [phone]  Follow Up Time:

## 2021-11-10 NOTE — DISCHARGE NOTE PROVIDER - NSDCCPCAREPLAN_GEN_ALL_CORE_FT
PRINCIPAL DISCHARGE DIAGNOSIS  Diagnosis: Acute pancreatitis  Assessment and Plan of Treatment: Your had pancreatitis that was caused by gallstones. You underwent surgery 11/10/21 with Dr. Curtis to remove your gallbladder. There were no complications. Follow up with Dr. Curtis Friday 11/19.21 [phone]. Gradually resume your diet. Start with fluids and gradually add solid foods. Avoid fatty or fried foods. Stick to small meals. Follow up with the family medicine clinic at [facility]. Return to the hospital for severe pain, fever, chest pain or shortness of breat.      SECONDARY DISCHARGE DIAGNOSES  Diagnosis: S/P laparoscopic cholecystectomy  Assessment and Plan of Treatment: Your gall bladder was removed due to gallstones causing pancreatitis

## 2021-11-10 NOTE — BRIEF OPERATIVE NOTE - NSICDXBRIEFPROCEDURE_GEN_ALL_CORE_FT
PROCEDURES:  Laparoscopic cholecystectomy in patient older than 10 years of age 10-Nov-2021 14:23:51  Thom Curtis

## 2021-11-10 NOTE — PROGRESS NOTE ADULT - SUBJECTIVE AND OBJECTIVE BOX
Patient is a 31y old  Female who presents with a chief complaint of pancreatitis       Patient seen and examined at bedside. Pt states they feel well, denies overnight events or current complaints including chest pain, shortness of breath, dizziness, nausea, vomiting, diarrhea, fever or chills.      ALLERGIES:  No Known Allergies    MEDICATIONS  (STANDING):  lactated ringers. 1000 milliLiter(s) (100 mL/Hr) IV Continuous <Continuous>  pantoprazole    Tablet 40 milliGRAM(s) Oral before breakfast    MEDICATIONS  (PRN):  acetaminophen     Tablet .. 650 milliGRAM(s) Oral every 6 hours PRN Temp greater or equal to 38C (100.4F), Mild Pain (1 - 3)  morphine  - Injectable 2 milliGRAM(s) IV Push every 6 hours PRN Severe Pain (7 - 10)    Vital Signs Last 24 Hrs  T(F): 99.4 (10 Nov 2021 05:02), Max: 99.4 (10 Nov 2021 05:02)  HR: 98 (10 Nov 2021 05:02) (69 - 98)  BP: 113/72 (10 Nov 2021 05:02) (110/88 - 119/71)  RR: 19 (10 Nov 2021 05:02) (16 - 19)  SpO2: 98% (10 Nov 2021 05:02) (98% - 99%)  I&O's Summary    09 Nov 2021 07:01  -  10 Nov 2021 07:00  --------------------------------------------------------  IN: 300 mL / OUT: 0 mL / NET: 300 mL      PHYSICAL EXAM:  General: NAD, A/O x 3  ENT: MMM, no oral thrush   Neck: Supple, No JVD  Lungs: Clear to auscultation bilaterally, non labored breathing  Cardio: RRR, S1/S2, No murmurs  Abdomen: Soft, Nontender, Nondistended; Bowel sounds present  Extremities: No calf tenderness, No pitting edema    LABS:                        11.5   5.33  )-----------( 208      ( 10 Nov 2021 05:30 )             35.7     11-10    139  |  105  |  4   ----------------------------<  95  3.6   |  26  |  0.86    Ca    8.2      10 Nov 2021 05:30    TPro  6.4  /  Alb  3.2  /  TBili  0.4  /  DBili  x   /  AST  21  /  ALT  28  /  AlkPhos  84  11-10    eGFR if Non African American: 90 mL/min/1.73M2 (11-10-21 @ 05:30)  eGFR if African American: 104 mL/min/1.73M2 (11-10-21 @ 05:30)    PT/INR - ( 10 Nov 2021 05:30 )   PT: 13.9 sec;   INR: 1.16 ratio         PTT - ( 10 Nov 2021 05:30 )  PTT:27.6 sec        11-09 Chol 128 mg/dL LDL -- HDL 45 mg/dL Trig 96 mg/dL                      COVID-19 PCR: NotDetec (11-09-21 @ 02:54)      RADIOLOGY & ADDITIONAL TESTS:    < from: US Abdomen Upper Quadrant Right (11.09.21 @ 09:05) >  FINDINGS:    Liver: Within normal limits.  Bile ducts: Normal caliber. Common bile duct measures 3 (mm).  Gallbladder: Multiple gallstones. No significant wall thickening or pericholecystic fluid..  Pancreas: Visualized portions are within normal limits.  Right kidney: 10.6 (cm). Nohydronephrosis.  Ascites: None.  IVC: Visualized portions are within normal limits.    IMPRESSION:    Cholelithiasis without secondary signs of acute cholecystitis or biliary dilatation..        --- End of Report ---              AUGUSTUS JACOBS MD; Attending Radiologist  This document has been electronically signed. Nov 9 2021  9:23AM    < end of copied text >      < from: CT Abdomen and Pelvis w/ IV Cont (11.09.21 @ 02:19) >    IMPRESSION:    No CT evidence of pancreatitis. Cholelithiasis with borderline gallbladder wall thickening; consider ultrasound and/or nuclear medicine HIDA scan for further evaluation.    --- End of Report ---              DOMINICK LIVE MD; Attending Radiologist  This document has been electronically signed. Nov 9 2021  2:43AM    < end of copied text >    Care Discussed with Consultants/Other Providers:   Surgery

## 2021-11-11 VITALS
HEART RATE: 82 BPM | TEMPERATURE: 98 F | RESPIRATION RATE: 18 BRPM | DIASTOLIC BLOOD PRESSURE: 68 MMHG | OXYGEN SATURATION: 99 % | SYSTOLIC BLOOD PRESSURE: 112 MMHG

## 2021-11-11 LAB
ANION GAP SERPL CALC-SCNC: 12 MMOL/L — SIGNIFICANT CHANGE UP (ref 5–17)
BASOPHILS # BLD AUTO: 0.03 K/UL — SIGNIFICANT CHANGE UP (ref 0–0.2)
BASOPHILS NFR BLD AUTO: 0.3 % — SIGNIFICANT CHANGE UP (ref 0–2)
BUN SERPL-MCNC: 4 MG/DL — LOW (ref 7–23)
CALCIUM SERPL-MCNC: 8.4 MG/DL — SIGNIFICANT CHANGE UP (ref 8.4–10.5)
CHLORIDE SERPL-SCNC: 108 MMOL/L — SIGNIFICANT CHANGE UP (ref 96–108)
CO2 SERPL-SCNC: 22 MMOL/L — SIGNIFICANT CHANGE UP (ref 22–31)
CREAT SERPL-MCNC: 0.7 MG/DL — SIGNIFICANT CHANGE UP (ref 0.5–1.3)
EOSINOPHIL # BLD AUTO: 0.01 K/UL — SIGNIFICANT CHANGE UP (ref 0–0.5)
EOSINOPHIL NFR BLD AUTO: 0.1 % — SIGNIFICANT CHANGE UP (ref 0–6)
GLUCOSE SERPL-MCNC: 86 MG/DL — SIGNIFICANT CHANGE UP (ref 70–99)
HCT VFR BLD CALC: 32.7 % — LOW (ref 34.5–45)
HGB BLD-MCNC: 10.9 G/DL — LOW (ref 11.5–15.5)
IMM GRANULOCYTES NFR BLD AUTO: 0.2 % — SIGNIFICANT CHANGE UP (ref 0–1.5)
LYMPHOCYTES # BLD AUTO: 2.35 K/UL — SIGNIFICANT CHANGE UP (ref 1–3.3)
LYMPHOCYTES # BLD AUTO: 26 % — SIGNIFICANT CHANGE UP (ref 13–44)
MCHC RBC-ENTMCNC: 26 PG — LOW (ref 27–34)
MCHC RBC-ENTMCNC: 33.3 GM/DL — SIGNIFICANT CHANGE UP (ref 32–36)
MCV RBC AUTO: 77.9 FL — LOW (ref 80–100)
MONOCYTES # BLD AUTO: 0.67 K/UL — SIGNIFICANT CHANGE UP (ref 0–0.9)
MONOCYTES NFR BLD AUTO: 7.4 % — SIGNIFICANT CHANGE UP (ref 2–14)
NEUTROPHILS # BLD AUTO: 5.95 K/UL — SIGNIFICANT CHANGE UP (ref 1.8–7.4)
NEUTROPHILS NFR BLD AUTO: 66 % — SIGNIFICANT CHANGE UP (ref 43–77)
NRBC # BLD: 0 /100 WBCS — SIGNIFICANT CHANGE UP (ref 0–0)
PLATELET # BLD AUTO: 184 K/UL — SIGNIFICANT CHANGE UP (ref 150–400)
POTASSIUM SERPL-MCNC: 3.9 MMOL/L — SIGNIFICANT CHANGE UP (ref 3.5–5.3)
POTASSIUM SERPL-SCNC: 3.9 MMOL/L — SIGNIFICANT CHANGE UP (ref 3.5–5.3)
RBC # BLD: 4.2 M/UL — SIGNIFICANT CHANGE UP (ref 3.8–5.2)
RBC # FLD: 15.9 % — HIGH (ref 10.3–14.5)
SODIUM SERPL-SCNC: 142 MMOL/L — SIGNIFICANT CHANGE UP (ref 135–145)
WBC # BLD: 9.03 K/UL — SIGNIFICANT CHANGE UP (ref 3.8–10.5)
WBC # FLD AUTO: 9.03 K/UL — SIGNIFICANT CHANGE UP (ref 3.8–10.5)

## 2021-11-11 PROCEDURE — 88304 TISSUE EXAM BY PATHOLOGIST: CPT

## 2021-11-11 PROCEDURE — 99239 HOSP IP/OBS DSCHRG MGMT >30: CPT

## 2021-11-11 PROCEDURE — 85025 COMPLETE CBC W/AUTO DIFF WBC: CPT

## 2021-11-11 PROCEDURE — 85027 COMPLETE CBC AUTOMATED: CPT

## 2021-11-11 PROCEDURE — 80061 LIPID PANEL: CPT

## 2021-11-11 PROCEDURE — 84702 CHORIONIC GONADOTROPIN TEST: CPT

## 2021-11-11 PROCEDURE — 80053 COMPREHEN METABOLIC PANEL: CPT

## 2021-11-11 PROCEDURE — 86769 SARS-COV-2 COVID-19 ANTIBODY: CPT

## 2021-11-11 PROCEDURE — 96361 HYDRATE IV INFUSION ADD-ON: CPT

## 2021-11-11 PROCEDURE — 74177 CT ABD & PELVIS W/CONTRAST: CPT | Mod: MA

## 2021-11-11 PROCEDURE — 85730 THROMBOPLASTIN TIME PARTIAL: CPT

## 2021-11-11 PROCEDURE — 86900 BLOOD TYPING SEROLOGIC ABO: CPT

## 2021-11-11 PROCEDURE — 71045 X-RAY EXAM CHEST 1 VIEW: CPT

## 2021-11-11 PROCEDURE — 86901 BLOOD TYPING SEROLOGIC RH(D): CPT

## 2021-11-11 PROCEDURE — 85379 FIBRIN DEGRADATION QUANT: CPT

## 2021-11-11 PROCEDURE — 76705 ECHO EXAM OF ABDOMEN: CPT

## 2021-11-11 PROCEDURE — 85610 PROTHROMBIN TIME: CPT

## 2021-11-11 PROCEDURE — 99285 EMERGENCY DEPT VISIT HI MDM: CPT | Mod: 25

## 2021-11-11 PROCEDURE — 96374 THER/PROPH/DIAG INJ IV PUSH: CPT | Mod: XU

## 2021-11-11 PROCEDURE — 86850 RBC ANTIBODY SCREEN: CPT

## 2021-11-11 PROCEDURE — 83690 ASSAY OF LIPASE: CPT

## 2021-11-11 PROCEDURE — 84484 ASSAY OF TROPONIN QUANT: CPT

## 2021-11-11 PROCEDURE — 80048 BASIC METABOLIC PNL TOTAL CA: CPT

## 2021-11-11 PROCEDURE — 93005 ELECTROCARDIOGRAM TRACING: CPT

## 2021-11-11 PROCEDURE — 36415 COLL VENOUS BLD VENIPUNCTURE: CPT

## 2021-11-11 PROCEDURE — 87635 SARS-COV-2 COVID-19 AMP PRB: CPT

## 2021-11-11 RX ORDER — KETOROLAC TROMETHAMINE 30 MG/ML
15 SYRINGE (ML) INJECTION ONCE
Refills: 0 | Status: DISCONTINUED | OUTPATIENT
Start: 2021-11-11 | End: 2021-11-11

## 2021-11-11 RX ADMIN — Medication 15 MILLIGRAM(S): at 13:06

## 2021-11-11 RX ADMIN — OXYCODONE HYDROCHLORIDE 5 MILLIGRAM(S): 5 TABLET ORAL at 09:50

## 2021-11-11 RX ADMIN — ENOXAPARIN SODIUM 40 MILLIGRAM(S): 100 INJECTION SUBCUTANEOUS at 13:16

## 2021-11-11 RX ADMIN — OXYCODONE HYDROCHLORIDE 5 MILLIGRAM(S): 5 TABLET ORAL at 08:59

## 2021-11-11 RX ADMIN — SODIUM CHLORIDE 100 MILLILITER(S): 9 INJECTION, SOLUTION INTRAVENOUS at 09:01

## 2021-11-11 NOTE — PROGRESS NOTE ADULT - ATTENDING COMMENTS
31F admitted with gallstone pancreatitis. Pain resolved in 24 hours. No acute cholecystitis noted, however, it was decided by surgery that patient should have gallbladder removed. Cholecystectomy done this morning. D/W Dr. Curtis - procedure went without complications, large gallstone noted. Patient to have full liquid diet and can be discharged later today if stable. NO antibiotics required upon d/c, as per surgery. Post surgical instructions as per surgery.
Medically stable for d/c

## 2021-11-11 NOTE — PROGRESS NOTE ADULT - SUBJECTIVE AND OBJECTIVE BOX
STATUS POST:  lap cholecystectomy for gallstone pancreatitis    POST OPERATIVE DAY: #1     Vital Signs Last 24 Hrs  T(C): 36.8 (11 Nov 2021 08:15), Max: 37.2 (10 Nov 2021 16:20)  T(F): 98.3 (11 Nov 2021 08:15), Max: 98.9 (10 Nov 2021 16:20)  HR: 74 (11 Nov 2021 08:15) (69 - 86)  BP: 103/67 (11 Nov 2021 08:15) (98/60 - 123/67)  BP(mean): --  RR: 16 (11 Nov 2021 08:15) (12 - 17)  SpO2: 97% (11 Nov 2021 08:15) (97% - 100%)      SUBJECTIVE: Pt seen laying in bed; denies N/V; tolerating po full liquids; voiding spontaneously; c/o right-sided rib/breast pain. worse with movement and deep breath; no SOB or cough; O2 sats 98% on RA; relief with Oxcodone; denies flatus or BM as of yet.    General Appearance: Appears well, transferred OOB to chair alert in NAD  Neck: Supple  Chest: clear breath sounds bilat with some splinting; chest wall non-tender to palpation; no ecchymosis; ROM intact at right shoulder/elbow without pain  CV: regular S1S2 @ 72 presently  Abdomen: hypoactive BS, soft; mild diffuse tendwerness, non-distended; port sites C&D  Extremities: no edema, calf or thigh tenderness; pulses intact, warm feet; sensation intact lt touch; DF/PF/KE/HF intact    I&O's Summary: reviewed    MEDICATIONS: noted  LABS:                        10.9   9.03  )-----------( 184      ( 11 Nov 2021 06:30 )             32.7     11-11    142  |  108  |  4<L>  ----------------------------<  86  3.9   |  22  |  0.70    Ca    8.4      11 Nov 2021 06:30    TPro  6.4  /  Alb  3.2<L>  /  TBili  0.4  /  DBili  x   /  AST  21  /  ALT  28  /  AlkPhos  84  11-10

## 2021-11-11 NOTE — PROGRESS NOTE ADULT - ASSESSMENT
A: s/p lap cholecystectomy POD#1 stable      gall bladder pancreatitis (lipase normalized)      Chest pain-likely from air insufflation    P: Encourage OOB, incentive spirometry      Advance diet to low fat      Analgesia      D/C home today if tolerates diet, follow-up Dr. Curtis tomorrow 11/12/21 A: s/p lap cholecystectomy POD#1 stable      gall bladder pancreatitis (lipase normalized)      Chest/shoulder pain-likely from air insufflation (no tachycardia, O2 sats >98%)    P: Encourage OOB, incentive spirometry      Advance diet to low fat      Analgesia prn      D/C home today if tolerates diet, follow-up Dr. Curtis Friday 11/19/21

## 2021-11-11 NOTE — PROGRESS NOTE ADULT - SUBJECTIVE AND OBJECTIVE BOX
Patient is a 31y old  Female who presents with a chief complaint of pancreatitis (10 Nov 2021 15:05)  Reports some discomfort under R breast at incision site-worse when she moves around  Tolerated breakfast this AM   Patient seen and examined at bedside.  Interpretor used: [id number]  ALLERGIES:  No Known Allergies    MEDICATIONS  (STANDING):  enoxaparin Injectable 40 milliGRAM(s) SubCutaneous daily  ketorolac   Injectable 15 milliGRAM(s) IV Push once  lactated ringers. 1000 milliLiter(s) (100 mL/Hr) IV Continuous <Continuous>    MEDICATIONS  (PRN):  acetaminophen     Tablet .. 650 milliGRAM(s) Oral every 6 hours PRN Mild Pain (1 - 3)  HYDROmorphone  Injectable 0.5 milliGRAM(s) IV Push every 6 hours PRN Severe Pain (7 - 10)  ondansetron Injectable 4 milliGRAM(s) IV Push every 6 hours PRN Nausea and/or Vomiting  oxyCODONE    IR 5 milliGRAM(s) Oral every 4 hours PRN Moderate Pain (4 - 6)    Vital Signs Last 24 Hrs  T(F): 98.3 (11 Nov 2021 08:15), Max: 98.9 (10 Nov 2021 16:20)  HR: 74 (11 Nov 2021 08:15) (69 - 86)  BP: 103/67 (11 Nov 2021 08:15) (98/60 - 123/67)  RR: 16 (11 Nov 2021 08:15) (12 - 17)  SpO2: 97% (11 Nov 2021 08:15) (97% - 100%)  I&O's Summary    10 Nov 2021 07:01  -  11 Nov 2021 07:00  --------------------------------------------------------  IN: 100 mL / OUT: 0 mL / NET: 100 mL    11 Nov 2021 07:01  -  11 Nov 2021 09:04  --------------------------------------------------------  IN: 300 mL / OUT: 0 mL / NET: 300 mL      BMI (kg/m2): 28.4 (11-10-21 @ 16:20)  PHYSICAL EXAM:  General: NAD, A/O x 3, [place] speaking  ENT: MMM, no thrush  Neck: Supple, No JVD  Lungs: Non labored breathing,  Clear to auscultation bilaterally,   Cardio: RRR, S1/S2,  no pitting edema bilaterally  Abdomen: Soft, Nontender, Nondistended; Bowel sounds present, + steristrips in place and c/d/i  Extremities: No calf tenderness, moves all extremities    LABS:                        10.9   9.03  )-----------( 184      ( 11 Nov 2021 06:30 )             32.7       11-11    142  |  108  |  4   ----------------------------<  86  3.9   |  22  |  0.70    Ca    8.4      11 Nov 2021 06:30    TPro  6.4  /  Alb  3.2  /  TBili  0.4  /  DBili  x   /  AST  21  /  ALT  28  /  AlkPhos  84  11-10     eGFR if Non African American: 114 mL/min/1.73M2 (11-11-21 @ 06:30)  eGFR if : 133 mL/min/1.73M2 (11-11-21 @ 06:30)    PT/INR - ( 10 Nov 2021 05:30 )   PT: 13.9 sec;   INR: 1.16 ratio         PTT - ( 10 Nov 2021 05:30 )  PTT:27.6 sec         11-09 Chol 128 mg/dL LDL -- HDL 45 mg/dL Trig 96 mg/dL                      COVID-19 PCR: NotDetec (11-09-21 @ 02:54)      RADIOLOGY & ADDITIONAL TESTS:    Care Discussed with Consultants/Other Providers:

## 2021-11-11 NOTE — PROGRESS NOTE ADULT - ASSESSMENT
31F hx of cholelithiasis pw chest pain with pancreatitis.    #Pancreatitis likely sec to gallstones  Pancreatitis resolved  CTAP with cholelithiasis with borderline GB wall thickening  AUS with cholelithiasis and no bilary dilation  Lipase 3339--> 507 -->121  Surgery Dr. Curtis. Post op day 1 lap nicola  tolerating low fat diet  Pain control: tylenol and ibuprofen PRN and PRN percocet sent to pharmacy   Appreciate GI and surgery recs      #DVT/GI proph: ambulate     Dispo: home today.  will come to pick her up at 11 AM .  She will follow up with Dr. Curtis tomorrow 31F hx of cholelithiasis pw chest pain with pancreatitis.    #Pancreatitis likely sec to gallstones  Pancreatitis resolved  CTAP with cholelithiasis with borderline GB wall thickening  AUS with cholelithiasis and no bilary dilation  Lipase 3339--> 507 -->121  Surgery Dr. Curtis. Post op day 1 lap nicola  tolerating low fat diet  Pain control: tylenol and ibuprofen PRN and PRN percocet sent to pharmacy   Appreciate GI and surgery recs      #DVT/GI proph: ambulate     Dispo: home today.  will come to pick her up at 11 AM .  She will follow up with Dr. Curtis tomorrow    Time spent on discharge - 32 mins

## 2021-11-15 LAB — SURGICAL PATHOLOGY STUDY: SIGNIFICANT CHANGE UP

## 2021-11-17 ENCOUNTER — OUTPATIENT (OUTPATIENT)
Dept: OUTPATIENT SERVICES | Facility: HOSPITAL | Age: 31
LOS: 1 days | End: 2021-11-17
Payer: SELF-PAY

## 2021-11-17 ENCOUNTER — APPOINTMENT (OUTPATIENT)
Dept: FAMILY MEDICINE | Facility: HOSPITAL | Age: 31
End: 2021-11-17

## 2021-11-17 VITALS
WEIGHT: 124 LBS | RESPIRATION RATE: 15 BRPM | SYSTOLIC BLOOD PRESSURE: 115 MMHG | DIASTOLIC BLOOD PRESSURE: 73 MMHG | HEART RATE: 87 BPM | BODY MASS INDEX: 25.04 KG/M2 | OXYGEN SATURATION: 100 % | TEMPERATURE: 97.8 F

## 2021-11-17 DIAGNOSIS — Z00.00 ENCOUNTER FOR GENERAL ADULT MEDICAL EXAMINATION WITHOUT ABNORMAL FINDINGS: ICD-10-CM

## 2021-11-17 PROCEDURE — 85025 COMPLETE CBC W/AUTO DIFF WBC: CPT

## 2021-11-17 PROCEDURE — 82728 ASSAY OF FERRITIN: CPT

## 2021-11-17 PROCEDURE — 83550 IRON BINDING TEST: CPT

## 2021-11-17 PROCEDURE — 84466 ASSAY OF TRANSFERRIN: CPT

## 2021-11-17 PROCEDURE — G0463: CPT

## 2021-11-17 RX ORDER — TERCONAZOLE 4 MG/G
0.4 CREAM VAGINAL
Qty: 1 | Refills: 1 | Status: DISCONTINUED | COMMUNITY
Start: 2018-11-26 | End: 2021-11-17

## 2021-11-18 DIAGNOSIS — Z09 ENCOUNTER FOR FOLLOW-UP EXAMINATION AFTER COMPLETED TREATMENT FOR CONDITIONS OTHER THAN MALIGNANT NEOPLASM: ICD-10-CM

## 2021-11-18 DIAGNOSIS — K85.10 BILIARY ACUTE PANCREATITIS WITHOUT NECROSIS OR INFECTION: ICD-10-CM

## 2021-11-19 ENCOUNTER — APPOINTMENT (OUTPATIENT)
Dept: SURGERY | Facility: CLINIC | Age: 31
End: 2021-11-19
Payer: MEDICAID

## 2021-11-19 VITALS
OXYGEN SATURATION: 98 % | HEART RATE: 72 BPM | TEMPERATURE: 97 F | WEIGHT: 124 LBS | HEIGHT: 59 IN | DIASTOLIC BLOOD PRESSURE: 70 MMHG | BODY MASS INDEX: 25 KG/M2 | SYSTOLIC BLOOD PRESSURE: 100 MMHG

## 2021-11-19 PROCEDURE — 99024 POSTOP FOLLOW-UP VISIT: CPT

## 2021-11-22 ENCOUNTER — OUTPATIENT (OUTPATIENT)
Dept: OUTPATIENT SERVICES | Facility: HOSPITAL | Age: 31
LOS: 1 days | End: 2021-11-22
Payer: SELF-PAY

## 2021-11-22 ENCOUNTER — RESULT REVIEW (OUTPATIENT)
Age: 31
End: 2021-11-22

## 2021-11-22 DIAGNOSIS — D64.9 ANEMIA, UNSPECIFIED: ICD-10-CM

## 2021-11-22 PROCEDURE — 76856 US EXAM PELVIC COMPLETE: CPT | Mod: 26

## 2021-11-22 PROCEDURE — 76830 TRANSVAGINAL US NON-OB: CPT

## 2021-11-22 PROCEDURE — 76830 TRANSVAGINAL US NON-OB: CPT | Mod: 26

## 2021-11-22 PROCEDURE — 76856 US EXAM PELVIC COMPLETE: CPT

## 2021-11-29 LAB
BASOPHILS # BLD AUTO: 0.05 K/UL
BASOPHILS NFR BLD AUTO: 0.7 %
EOSINOPHIL # BLD AUTO: 0.08 K/UL
EOSINOPHIL NFR BLD AUTO: 1.2 %
FERRITIN SERPL-MCNC: 12 NG/ML
HCT VFR BLD CALC: 40.1 %
HGB BLD-MCNC: 12.7 G/DL
IMM GRANULOCYTES NFR BLD AUTO: 0.3 %
IRON SATN MFR SERPL: 7 %
IRON SERPL-MCNC: 35 UG/DL
LYMPHOCYTES # BLD AUTO: 2.24 K/UL
LYMPHOCYTES NFR BLD AUTO: 32.9 %
MAN DIFF?: NORMAL
MCHC RBC-ENTMCNC: 25.7 PG
MCHC RBC-ENTMCNC: 31.7 GM/DL
MCV RBC AUTO: 81.2 FL
MONOCYTES # BLD AUTO: 0.41 K/UL
MONOCYTES NFR BLD AUTO: 6 %
NEUTROPHILS # BLD AUTO: 4 K/UL
NEUTROPHILS NFR BLD AUTO: 58.9 %
PLATELET # BLD AUTO: 293 K/UL
RBC # BLD: 4.94 M/UL
RBC # FLD: 15.9 %
TIBC SERPL-MCNC: 514 UG/DL
TRANSFERRIN SERPL-MCNC: 412 MG/DL
UIBC SERPL-MCNC: 479 UG/DL
WBC # FLD AUTO: 6.8 K/UL

## 2021-11-30 NOTE — HISTORY OF PRESENT ILLNESS
[Admitted on: ___] : The patient was admitted on [unfilled] [Discharged on ___] : discharged on [unfilled] [Discharge Summary] : discharge summary [Pertinent Labs] : pertinent labs [Discharge Med List] : discharge medication list [Med Reconciliation] : medication reconciliation has been completed [[email or web address]] : 31F PMHx of cholelithiasis presents for followup after hospital discharge. Pt was admitted on 11/9 with c/o chest pain. Presented with sharp, severe 10/10 retrosternal chest pain that travel across the chest with no associated SOB, diaphoresis, palpitations, HA, dizziness, nausea or vomiting. Labs remarkable for elevated lipase at 3339. Abdominal US demonstrating cholelithiasis. CTAP with cholelithiasis with borderline GB wall thickening. Admitted with gallstone pancreatitits. Lipase trended down 3339--> 507 -->121. S/p lap nicola 11/10 with Dr. Curtis, no complications during surgery. Today pt states symptoms improved, still mild pain to surgical incision sites. Using Tylenol to help with pain. Tolerating regular diet. Denies fever, chills, chest pain, SOB, N/V, headache, cough, palpitations, leg pain, dizziness, weakness. \par

## 2021-11-30 NOTE — PHYSICAL EXAM
[Normal Rate] : normal rate  [Regular Rhythm] : with a regular rhythm [Normal S1, S2] : normal S1 and S2 [Soft] : abdomen soft [Non-distended] : non-distended [Normal Bowel Sounds] : normal bowel sounds [No Acute Distress] : no acute distress [Well Nourished] : well nourished [Well Developed] : well developed [Well-Appearing] : well-appearing [No Respiratory Distress] : no respiratory distress  [No Accessory Muscle Use] : no accessory muscle use [Clear to Auscultation] : lungs were clear to auscultation bilaterally [No Murmur] : no murmur heard [No HSM] : no HSM [de-identified] : +laparoscopic incision sites, clean, dry and intact

## 2021-11-30 NOTE — ASSESSMENT
[FreeTextEntry1] : 30 y/o F presents for f/u after hospital discharge \par \par #Gallstone Pancreatitis \par -s/p lap nicola 11/10, postoperative course uncomplicated\par -continue low fat diet\par -continue Tylenol for pain \par -f/u with surgery on Friday for post-op followup\par \par #Anemia\par -noted to have low hemoglobin on multiple CBCs\par -period last 4-5 days with medium heaviness\par -f/u iron studies\par -f/u US pelvis/TVUS\par \par RTC in one month for CPE\par \par Case discussed with Dr. Landis

## 2021-12-14 ENCOUNTER — OUTPATIENT (OUTPATIENT)
Dept: OUTPATIENT SERVICES | Facility: HOSPITAL | Age: 31
LOS: 1 days | End: 2021-12-14
Payer: SELF-PAY

## 2021-12-14 ENCOUNTER — APPOINTMENT (OUTPATIENT)
Dept: FAMILY MEDICINE | Facility: HOSPITAL | Age: 31
End: 2021-12-14

## 2021-12-14 VITALS
HEART RATE: 74 BPM | HEIGHT: 59 IN | WEIGHT: 119 LBS | SYSTOLIC BLOOD PRESSURE: 110 MMHG | RESPIRATION RATE: 16 BRPM | BODY MASS INDEX: 23.99 KG/M2 | OXYGEN SATURATION: 98 % | TEMPERATURE: 96.8 F | DIASTOLIC BLOOD PRESSURE: 72 MMHG

## 2021-12-14 DIAGNOSIS — Z87.898 PERSONAL HISTORY OF OTHER SPECIFIED CONDITIONS: ICD-10-CM

## 2021-12-14 DIAGNOSIS — K85.10 BILIARY ACUTE PANCREATITIS WITHOUT NECROSIS OR INFECTION: ICD-10-CM

## 2021-12-14 DIAGNOSIS — Z34.82 ENCOUNTER FOR SUPERVISION OF OTHER NORMAL PREGNANCY, SECOND TRIMESTER: ICD-10-CM

## 2021-12-14 DIAGNOSIS — Z86.2 PERSONAL HISTORY OF DISEASES OF THE BLOOD AND BLOOD-FORMING ORGANS AND CERTAIN DISORDERS INVOLVING THE IMMUNE MECHANISM: ICD-10-CM

## 2021-12-14 DIAGNOSIS — R76.12 NONSPECIFIC REACTION TO CELL MEDIATED IMMUNITY MEASUREMENT OF GAMMA INTERFERON ANTIGEN RESPONSE W/OUT ACTIVE TUBERCULOSIS: ICD-10-CM

## 2021-12-14 DIAGNOSIS — O99.810 ABNORMAL GLUCOSE COMPLICATING PREGNANCY: ICD-10-CM

## 2021-12-14 DIAGNOSIS — Z00.00 ENCOUNTER FOR GENERAL ADULT MEDICAL EXAMINATION WITHOUT ABNORMAL FINDINGS: ICD-10-CM

## 2021-12-14 DIAGNOSIS — Z34.93 ENCOUNTER FOR SUPERVISION OF NORMAL PREGNANCY, UNSPECIFIED, THIRD TRIMESTER: ICD-10-CM

## 2021-12-14 PROCEDURE — 87624 HPV HI-RISK TYP POOLED RSLT: CPT

## 2021-12-14 PROCEDURE — G0463: CPT

## 2021-12-15 DIAGNOSIS — Z01.419 ENCOUNTER FOR GYNECOLOGICAL EXAMINATION (GENERAL) (ROUTINE) WITHOUT ABNORMAL FINDINGS: ICD-10-CM

## 2021-12-20 LAB
CYTOLOGY CVX/VAG DOC THIN PREP: NORMAL
HPV HIGH+LOW RISK DNA PNL CVX: NOT DETECTED

## 2022-01-02 PROBLEM — Z86.2 HISTORY OF ANEMIA: Status: RESOLVED | Noted: 2021-11-17 | Resolved: 2022-01-02

## 2022-01-02 PROBLEM — Z87.898 HISTORY OF URINARY FREQUENCY: Status: RESOLVED | Noted: 2017-07-17 | Resolved: 2022-01-02

## 2022-01-02 PROBLEM — Z34.93 ENCOUNTER FOR PREGNANCY RELATED EXAMINATION IN THIRD TRIMESTER: Status: RESOLVED | Noted: 2019-02-21 | Resolved: 2022-01-02

## 2022-01-02 PROBLEM — Z87.898 HISTORY OF DIARRHEA: Status: RESOLVED | Noted: 2017-07-17 | Resolved: 2022-01-02

## 2022-01-02 PROBLEM — Z34.82 NORMAL PREGNANCY IN MULTIGRAVIDA IN SECOND TRIMESTER: Status: RESOLVED | Noted: 2018-11-07 | Resolved: 2022-01-02

## 2022-01-02 PROBLEM — R76.12 POSITIVE QUANTIFERON-TB GOLD TEST: Status: RESOLVED | Noted: 2019-02-21 | Resolved: 2022-01-02

## 2022-01-02 PROBLEM — Z87.898 HISTORY OF ABDOMINAL PAIN: Status: RESOLVED | Noted: 2017-07-17 | Resolved: 2022-01-02

## 2022-01-02 PROBLEM — K85.10 GALLSTONE PANCREATITIS: Status: RESOLVED | Noted: 2021-11-19 | Resolved: 2022-01-02

## 2022-01-02 PROBLEM — O99.810 ABNORMAL GLUCOSE IN PREGNANCY, ANTEPARTUM: Status: RESOLVED | Noted: 2019-02-07 | Resolved: 2022-01-02

## 2022-01-05 NOTE — PLAN
[FreeTextEntry1] : 30 y/o F PMH gallstone pancreatitis, GERD presents for CPE\par \par #HCM\par -pap + HPV performed today, will f/u results\par -received both doses covid vaccine \par -flu shot deferred today \par \par RTC in one year or PRN\par \par Case discussed with Dr. Coleman

## 2022-01-05 NOTE — PHYSICAL EXAM
[Urinary Bladder Findings] : the bladder was normal on palpation [External Female Genitalia] : normal external genitalia [Vagina] : normal vaginal exam [Cervix] : normal cervix [Uterus] : uterus was normal size, without masses or tenderness [Uterine Adnexae] : normal adnexa [No Bleeding] : no active bleeding [Normal] : no joint swelling and grossly normal strength and tone [Coordination Grossly Intact] : coordination grossly intact [No Focal Deficits] : no focal deficits [Normal Gait] : normal gait [Speech Grossly Normal] : speech grossly normal [Memory Grossly Normal] : memory grossly normal [Normal Affect] : the affect was normal [Normal Mood] : the mood was normal [Normal Insight/Judgement] : insight and judgment were intact [Atrophy] : no atrophy [Discharge] : no discharge

## 2022-01-05 NOTE — HEALTH RISK ASSESSMENT
[With Family] : lives with family [Sexually Active] : sexually active [Good] : ~his/her~  mood as  good [Never] : Never [No] : No [No falls in past year] : Patient reported no falls in the past year [Unemployed] : unemployed [Fully functional (bathing, dressing, toileting, transferring, walking, feeding)] : Fully functional (bathing, dressing, toileting, transferring, walking, feeding) [Fully functional (using the telephone, shopping, preparing meals, housekeeping, doing laundry, using] : Fully functional and needs no help or supervision to perform IADLs (using the telephone, shopping, preparing meals, housekeeping, doing laundry, using transportation, managing medications and managing finances) [Change in mental status noted] : No change in mental status noted [Reports changes in hearing] : Reports no changes in hearing [Reports changes in vision] : Reports no changes in vision

## 2022-01-05 NOTE — HISTORY OF PRESENT ILLNESS
[FreeTextEntry1] : 30 y/o F presents for CPE [de-identified] : 30 y/o F PMH gallstone pancreatitis, GERD presents for CPE. Pt LS in November f/u hospital discharge for gallstone pancreatitis. Pt doing well today. Denies fever, chills, chest pain, SOB, N/V, abdominal pain, headache, cough, palpitations, leg pain, dizziness, weakness. Denies alcohol use, cigarette use, or recreational drug use.  Pt due for pap today. LMP 11/14/21

## 2022-09-08 NOTE — PATIENT PROFILE OB - ABILITY TO HEAR (WITH HEARING AID OR HEARING APPLIANCE IF NORMALLY USED):
Patient had to reschedule her upcoming appointment for follow up on phentermine in September due to Jury duty, next available appointment in December.     Doing great on the Phentermine!   No Side effects and she's lost 15 pounds, would like to continue and needs a refill to get her to the next appointment.     Requested Prescriptions     Pending Prescriptions Disp Refills    phentermine (ADIPEX-P) 37.5 mg tablet 30 tablet 1     Sig: Take 1 tablet (37.5 mg total) by mouth before breakfast.       
Adequate: hears normal conversation without difficulty

## 2023-04-19 ENCOUNTER — APPOINTMENT (OUTPATIENT)
Dept: FAMILY MEDICINE | Facility: HOSPITAL | Age: 33
End: 2023-04-19

## 2023-05-15 NOTE — ED ADULT NURSE NOTE - ED STAT RN HANDOFF TIME
Called patient to see if she had gotten in to be seen at the [facility] yet.  She was checked in but sitting in waiting area.    Called [facility] to see if we can get her scheduled to see Dr. Almazan sooner than she can get in.  Transferred through.    Adalgisa Hamilton RN on 5/15/2023 at 12:33 PM    
Please look into this
07:23

## 2023-10-11 NOTE — ED PROVIDER NOTE - NS_EDPROVIDERDISPOUSERTYPE_ED_A_ED

## 2024-08-27 NOTE — PATIENT PROFILE ADULT - NSPROLASTMENSTRUAL_GEN_A_NUR
10/28/21 Patient instructed to take valsartan and labetolol with a sip of water on the morning of procedure.     H/o two cardiac stents 2018 - asymptomatic, echo in [facility] 2021.

## 2025-03-01 NOTE — BRIEF OPERATIVE NOTE - DISPOSITION
Problem: Adult Inpatient Plan of Care  Goal: Plan of Care Review  Outcome: Progressing   VIRTUAL NURSE:  Labs, notes, orders, and careplan reviewed.   PACU PAST SURGICAL HISTORY:  Basal cell cancer s/p Mohs surgery    History of arthroscopy of knee Right    History of hysterectomy     History of retinal tear surgical repair    S/P cataract surgery bilateral
